# Patient Record
Sex: FEMALE | Race: WHITE | NOT HISPANIC OR LATINO
[De-identification: names, ages, dates, MRNs, and addresses within clinical notes are randomized per-mention and may not be internally consistent; named-entity substitution may affect disease eponyms.]

---

## 2017-08-22 ENCOUNTER — RESULT REVIEW (OUTPATIENT)
Age: 62
End: 2017-08-22

## 2018-03-01 ENCOUNTER — OUTPATIENT (OUTPATIENT)
Dept: OUTPATIENT SERVICES | Facility: HOSPITAL | Age: 63
LOS: 1 days | End: 2018-03-01
Payer: MEDICAID

## 2018-03-01 PROCEDURE — G9001: CPT

## 2018-03-13 ENCOUNTER — EMERGENCY (EMERGENCY)
Facility: HOSPITAL | Age: 63
LOS: 1 days | Discharge: HOME | End: 2018-03-13
Attending: EMERGENCY MEDICINE
Payer: MEDICAID

## 2018-03-13 VITALS — WEIGHT: 141.98 LBS | HEIGHT: 67 IN

## 2018-03-13 VITALS
SYSTOLIC BLOOD PRESSURE: 101 MMHG | RESPIRATION RATE: 16 BRPM | HEART RATE: 78 BPM | DIASTOLIC BLOOD PRESSURE: 68 MMHG | OXYGEN SATURATION: 96 % | TEMPERATURE: 98 F

## 2018-03-13 PROCEDURE — 99284 EMERGENCY DEPT VISIT MOD MDM: CPT

## 2018-03-13 PROCEDURE — 70450 CT HEAD/BRAIN W/O DYE: CPT

## 2018-03-13 PROCEDURE — 99284 EMERGENCY DEPT VISIT MOD MDM: CPT | Mod: 25

## 2018-03-13 PROCEDURE — 70450 CT HEAD/BRAIN W/O DYE: CPT | Mod: 26

## 2018-03-13 RX ORDER — ACETAMINOPHEN 500 MG
650 TABLET ORAL ONCE
Qty: 0 | Refills: 0 | Status: COMPLETED | OUTPATIENT
Start: 2018-03-13 | End: 2018-03-13

## 2018-03-13 RX ORDER — ACETAMINOPHEN WITH CODEINE 300MG-30MG
1 TABLET ORAL ONCE
Qty: 0 | Refills: 0 | Status: DISCONTINUED | OUTPATIENT
Start: 2018-03-13 | End: 2018-03-13

## 2018-03-13 RX ORDER — IBUPROFEN 200 MG
600 TABLET ORAL ONCE
Qty: 0 | Refills: 0 | Status: COMPLETED | OUTPATIENT
Start: 2018-03-13 | End: 2018-03-13

## 2018-03-13 RX ADMIN — Medication 1 TABLET(S): at 23:22

## 2018-03-13 RX ADMIN — Medication 1 TABLET(S): at 22:30

## 2018-03-13 RX ADMIN — Medication 600 MILLIGRAM(S): at 23:27

## 2018-03-13 NOTE — ED ADULT TRIAGE NOTE - CHIEF COMPLAINT QUOTE
" I fell 3 days ago and hit the side of my face, the swelling on my right eye is getting worst instead of getting better, I did not passed out "

## 2018-03-13 NOTE — ED PROVIDER NOTE - CRANIAL NERVE AND PUPILLARY EXAM
cough reflex intact/central vision intact/peripheral vision intact/gag reflex intact/tongue is midline/cranial nerves 2-12 intact/corneal reflex intact/central and peripheral vision intact/extra-ocular movements intact

## 2018-03-13 NOTE — ED PROVIDER NOTE - MEDICAL DECISION MAKING DETAILS
discussed ct findings. having sx of concussion. discussed hydration, no mental, emotional or physical straining. prn nsaids. Discussed anticipatory guidance and return precautions. Discussed results and gave copy to pt.  Dc with outpt follow up.

## 2018-03-15 DIAGNOSIS — R69 ILLNESS, UNSPECIFIED: ICD-10-CM

## 2018-05-01 ENCOUNTER — APPOINTMENT (OUTPATIENT)
Dept: SURGERY | Facility: CLINIC | Age: 63
End: 2018-05-01

## 2019-09-25 ENCOUNTER — RESULT REVIEW (OUTPATIENT)
Age: 64
End: 2019-09-25

## 2019-10-04 PROBLEM — E78.5 HYPERLIPIDEMIA, UNSPECIFIED: Chronic | Status: ACTIVE | Noted: 2018-03-13

## 2019-10-15 ENCOUNTER — APPOINTMENT (OUTPATIENT)
Dept: SURGERY | Facility: CLINIC | Age: 64
End: 2019-10-15
Payer: MEDICAID

## 2019-10-15 VITALS
DIASTOLIC BLOOD PRESSURE: 78 MMHG | TEMPERATURE: 98.3 F | OXYGEN SATURATION: 97 % | BODY MASS INDEX: 25.43 KG/M2 | SYSTOLIC BLOOD PRESSURE: 125 MMHG | WEIGHT: 162 LBS | HEIGHT: 67 IN | HEART RATE: 61 BPM

## 2019-10-15 DIAGNOSIS — Z78.9 OTHER SPECIFIED HEALTH STATUS: ICD-10-CM

## 2019-10-15 DIAGNOSIS — Z86.39 PERSONAL HISTORY OF OTHER ENDOCRINE, NUTRITIONAL AND METABOLIC DISEASE: ICD-10-CM

## 2019-10-15 DIAGNOSIS — Z83.3 FAMILY HISTORY OF DIABETES MELLITUS: ICD-10-CM

## 2019-10-15 DIAGNOSIS — Z86.79 PERSONAL HISTORY OF OTHER DISEASES OF THE CIRCULATORY SYSTEM: ICD-10-CM

## 2019-10-15 PROCEDURE — 45300 PROCTOSIGMOIDOSCOPY DX: CPT

## 2019-10-15 PROCEDURE — 99203 OFFICE O/P NEW LOW 30 MIN: CPT | Mod: 25

## 2019-10-15 RX ORDER — ZOLPIDEM TARTRATE 5 MG/1
TABLET, FILM COATED ORAL
Refills: 0 | Status: ACTIVE | COMMUNITY

## 2019-10-15 RX ORDER — PRAVASTATIN SODIUM 80 MG/1
TABLET ORAL
Refills: 0 | Status: ACTIVE | COMMUNITY

## 2019-10-15 RX ORDER — ALPRAZOLAM 2 MG/1
2 TABLET ORAL
Refills: 0 | Status: ACTIVE | COMMUNITY

## 2019-10-15 RX ORDER — SERTRALINE HYDROCHLORIDE 100 MG/1
100 TABLET, FILM COATED ORAL
Refills: 0 | Status: ACTIVE | COMMUNITY

## 2019-10-15 NOTE — PLAN
[FreeTextEntry1] : Rigid sigmoidoscopy\par Patient was placed in left lateral position. After a digital rectal exam, the sigmoidoscope was inserted gently.\par Scope was passed to 12   cm. \par Findings:; no bleeding seen. no masses. internal grade II hemorrhoids/external hemorrhoids seen \par \par \par patient is asymptomatic. surgical intervention not recommended at this time. \par patient will follow up if needed. \par

## 2019-10-15 NOTE — CONSULT LETTER
[Dear  ___] : Dear  [unfilled], [Consult Closing:] : Thank you very much for allowing me to participate in the care of this patient.  If you have any questions, please do not hesitate to contact me. [Consult Letter:] : I had the pleasure of evaluating your patient, [unfilled]. [Sincerely,] : Sincerely, [FreeTextEntry3] : Julian Sequeira MD\par

## 2019-10-15 NOTE — PHYSICAL EXAM
[Normal Breath Sounds] : Normal breath sounds [Normal Rate and Rhythm] : normal rate and rhythm [No Rash or Lesion] : No rash or lesion [Alert] : alert [Oriented to Time] : oriented to time [Oriented to Person] : oriented to person [Oriented to Place] : oriented to place [Calm] : calm [JVD] : no jugular venous distention  [de-identified] : A/Ox3; NAD. appears comfortable [de-identified] : EOMI [de-identified] : abd is soft, NT/ND\par surgical scar noted to the RUQ 2/2 open cholecystectomy \par  [de-identified] : +ROM, no joint swelling [de-identified] : RONIT- external hemorrhoids seen. no masses felt. no bleeding, no tenderness

## 2019-11-15 NOTE — REVIEW OF SYSTEMS
11/15:  Decreased lipitor to 40mg qhs      [Chills] : no chills [Fever] : no fever [Chest Pain] : no chest pain [Feeling Poorly] : not feeling poorly [Shortness Of Breath] : no shortness of breath [Lower Ext Edema] : no lower extremity edema [Wheezing] : no wheezing [Cough] : no cough [Abdominal Pain] : no abdominal pain [Diarrhea] : no diarrhea [Joint Swelling] : no joint swelling [Pelvic Pain] : no pelvic pain [Skin Lesions] : no skin lesions [Joint Stiffness] : no joint stiffness [Skin Wound] : no skin wound [Dizziness] : no dizziness [Muscle Weakness] : no muscle weakness [Anxiety] : no anxiety [Swollen Glands] : no swollen glands [FreeTextEntry7] : s/p open cholecystectomy, years ago

## 2021-11-18 ENCOUNTER — TRANSCRIPTION ENCOUNTER (OUTPATIENT)
Age: 66
End: 2021-11-18

## 2022-02-20 ENCOUNTER — EMERGENCY (EMERGENCY)
Facility: HOSPITAL | Age: 67
LOS: 1 days | Discharge: HOME | End: 2022-02-20
Attending: EMERGENCY MEDICINE
Payer: COMMERCIAL

## 2022-02-20 VITALS
RESPIRATION RATE: 18 BRPM | TEMPERATURE: 99 F | OXYGEN SATURATION: 97 % | HEIGHT: 67 IN | WEIGHT: 169.98 LBS | DIASTOLIC BLOOD PRESSURE: 94 MMHG | HEART RATE: 73 BPM | SYSTOLIC BLOOD PRESSURE: 168 MMHG

## 2022-02-20 LAB
HCT VFR BLD CALC: 36.2 % — SIGNIFICANT CHANGE UP (ref 34.5–45)
HGB BLD-MCNC: 12.2 G/DL — SIGNIFICANT CHANGE UP (ref 11.5–15.5)
MCHC RBC-ENTMCNC: 31.4 PG — SIGNIFICANT CHANGE UP (ref 27–34)
MCHC RBC-ENTMCNC: 33.7 GM/DL — SIGNIFICANT CHANGE UP (ref 32–36)
MCV RBC AUTO: 93.1 FL — SIGNIFICANT CHANGE UP (ref 80–100)
PLATELET # BLD AUTO: 227 K/UL — SIGNIFICANT CHANGE UP (ref 150–400)
RBC # BLD: 3.89 M/UL — SIGNIFICANT CHANGE UP (ref 3.8–5.2)
RBC # FLD: 12.8 % — SIGNIFICANT CHANGE UP (ref 10.3–14.5)

## 2022-02-20 PROCEDURE — 70450 CT HEAD/BRAIN W/O DYE: CPT | Mod: 26,MA

## 2022-02-20 PROCEDURE — 73080 X-RAY EXAM OF ELBOW: CPT | Mod: 26,LT

## 2022-02-20 PROCEDURE — 99285 EMERGENCY DEPT VISIT HI MDM: CPT

## 2022-02-20 RX ORDER — SODIUM CHLORIDE 9 MG/ML
1000 INJECTION INTRAMUSCULAR; INTRAVENOUS; SUBCUTANEOUS ONCE
Refills: 0 | Status: COMPLETED | OUTPATIENT
Start: 2022-02-20 | End: 2022-02-20

## 2022-02-20 NOTE — ED ADULT NURSE NOTE - NS ED NOTE  TALK SOMEONE YN
[FreeTextEntry1] : alert and oriented x 3, speech fluent, names easily, follows requests, good recall for recent and remote events.\par EOM full without sustained nystagmus, PERRL, intact LT V1-V3 bilaterally, face symmetrical, no dysarthria, tongue midline, normal shoulder elevation.\par Motor - normal motion in upper extremities bilaterally. normal rapid-alternating movements, no drift\par Sensory - intact LT x 4 ext\par Coord - no tremor, ataxia\par Gait - stands without difficulty 
No

## 2022-02-20 NOTE — ED ADULT TRIAGE NOTE - CHIEF COMPLAINT QUOTE
c/o bruise to rt. and left temple and pain to back of the head only when touched, s/p fall yesterday at a party , have some drinks , I am fine but my family insist to see a doctor  as per patient

## 2022-02-20 NOTE — ED ADULT NURSE NOTE - OBJECTIVE STATEMENT
66 yo female lying on bed c/o pain on the back of the head and bruise to bilateral temple s/p fall yesterday at a party. 68 yo female lying on bed c/o pain on the back of the head and bruise noted on bilateral temple s/p fall yesterday at a party. Patient denies LOC.

## 2022-02-20 NOTE — ED PROVIDER NOTE - PATIENT PORTAL LINK FT
You can access the FollowMyHealth Patient Portal offered by [facility] by registering at the following website: [email or web address]. By joining [facility]’s FollowMyHealth portal, you will also be able to view your health information using other applications (apps) compatible with our system.

## 2022-02-20 NOTE — ED PROVIDER NOTE - NSFOLLOWUPINSTRUCTIONS_ED_ALL_ED_FT
Alcohol Abuse and Dependence Information, Adult      Alcohol is a widely available drug. People drink alcohol in different amounts. People who drink alcohol very often and in large amounts often have problems during and after drinking. They may develop what is called an alcohol use disorder. There are two main types of alcohol use disorders:  •Alcohol abuse. This is when you use alcohol too much or too often. You may use alcohol to make yourself feel happy or to reduce stress. You may have a hard time setting a limit on the amount you drink.      •Alcohol dependence. This is when you use alcohol consistently for a period of time, and your body changes as a result. This can make it hard to stop drinking because you may start to feel sick or feel different when you do not use alcohol. These symptoms are known as withdrawal.        How can alcohol abuse and dependence affect me?    Alcohol abuse and dependence can have a negative effect on your life. Drinking too much can lead to addiction. You may feel like you need alcohol to function normally. You may drink alcohol before work in the morning, during the day, or as soon as you get home from work in the evening. These actions can result in:  •Poor work performance.      •Job loss.      •Financial problems.       •Car crashes or criminal charges from driving after drinking alcohol.      •Problems in your relationships with friends and family.       •Losing the trust and respect of coworkers, friends, and family.      Drinking heavily over a long period of time can permanently damage your body and brain, and can cause lifelong health issues, such as:  •Damage to your liver or pancreas.      •Heart problems, high blood pressure, or stroke.      •Certain cancers.      •Decreased ability to fight infections.      •Brain or nerve damage.      •Depression.      •Early (premature) death.      If you are careless or you crave alcohol, it is easy to drink more than your body can handle (overdose). Alcohol overdose is a serious situation that requires hospitalization. It may lead to permanent injuries or death.      What can increase my risk?    •Having a family history of alcohol abuse.      •Having depression or other mental health conditions.      •Beginning to drink at an early age.      •Binge drinking often.      •Experiencing trauma, stress, and an unstable home life during childhood.      •Spending time with people who drink often.        What actions can I take to prevent or manage alcohol abuse and dependence?  • Do not drink alcohol if:  •Your health care provider tells you not to drink.       •You are pregnant, may be pregnant, or are planning to become pregnant.      •If you drink alcohol:•Limit how much you use to:  •0–1 drink a day for women.       •0–2 drinks a day for men.        •Be aware of how much alcohol is in your drink. In the [place] one drink equals one 12 oz bottle of beer (355 mL), one 5 oz glass of wine (148 mL), or one 1½ oz glass of hard liquor (44 mL).        •Stop drinking if you have been drinking too much. This can be very hard to do if you are used to abusing alcohol. If you begin to have withdrawal symptoms, talk with your health care provider or a person that you trust. These symptoms may include anxiety, shaky hands, headache, nausea, sweating, or not being able to sleep.      •Choose to drink nonalcoholic beverages in social gatherings and places where there may be alcohol.      Activity     •Spend more time on activities that you enjoy that do not involve alcohol, like hobbies or exercise.      • Find healthy ways to cope with stress, such as exercise, meditation, or spending time with people you care about.       General information     •Talk to your family, coworkers, and friends about supporting you in your efforts to stop drinking. If they drink, ask them not to drink around you. Spend more time with people who do not drink alcohol.    •If you think that you have an alcohol dependency problem:  •Tell friends or family about your concerns.      •Talk with your health care provider or another health professional about where to get help.      •Work with a therapist and a chemical dependency counselor.      •Consider joining a support group for people who struggle with alcohol abuse and dependence.          Where to find support     •Your health care provider.      •SMART Recovery: www.smartrecovery.org      Therapy and support groups     •Local treatment centers or chemical dependency counselors.      •Local AA groups in your community: www.aa.org        Where to find more information    •Centers for Disease Control and Prevention: www.cdc.gov      •National [place] on Alcohol Abuse and Alcoholism: www.niaaa.nih.gov      •Alcoholics Anonymous (AA): www.aa.org        Contact a health care provider if:    •You drank more or for longer than you intended on more than one occasion.      •You tried to stop drinking or to cut back on how much you drink, but you were not able to.      •You often drink to the point of vomiting or passing out.      •You want to drink so badly that you cannot think about anything else.      •You have problems in your life due to drinking, but you continue to drink.      •You keep drinking even though you feel anxious, depressed, or have experienced memory loss.      •You have stopped doing the things you used to enjoy in order to drink.      •You have to drink more than you used to in order to get the effect you want.      •You experience anxiety, sweating, nausea, shakiness, and trouble sleeping when you try to stop drinking.        Get help right away if:    •You have thoughts about hurting yourself or others.    •You have serious withdrawal symptoms, including:  •Confusion.      •Racing heart.      •High blood pressure.      •Fever.        If you ever feel like you may hurt yourself or others, or have thoughts about taking your own life, get help right away. You can go to your nearest emergency department or call:   • Your local emergency services (911 in the [place]       • A suicide crisis helpline, such as the National Suicide Prevention Lifeline at 1-617.963.7369. This is open 24 hours a day.         Summary    •Alcohol abuse and dependence can have a negative effect on your life. Drinking too much or too often can lead to addiction.      •If you drink alcohol, limit how much you use.      •If you are having trouble keeping your drinking under control, find ways to change your behavior. Hobbies, calming activities, exercise, or support groups can help.      •If you feel you need help with changing your drinking habits, talk with your health care provider, a good friend, or a therapist, or go to an AA group.      This information is not intended to replace advice given to you by your health care provider. Make sure you discuss any questions you have with your health care provider.

## 2022-02-20 NOTE — ED PROVIDER NOTE - CLINICAL SUMMARY MEDICAL DECISION MAKING FREE TEXT BOX
68 yo F chronic alcoholic s/p mechanical fall while drunk yesterday. Pt brought in by sister for eval. Pt with normal neuro exam. no signs to suggest withdrawal or other drug use. No SI or hallucinations. Will perform CT head and consult SW for rehab options.

## 2022-02-21 VITALS
SYSTOLIC BLOOD PRESSURE: 128 MMHG | HEART RATE: 76 BPM | RESPIRATION RATE: 18 BRPM | DIASTOLIC BLOOD PRESSURE: 82 MMHG | OXYGEN SATURATION: 97 % | TEMPERATURE: 98 F

## 2022-02-21 LAB
ALBUMIN SERPL ELPH-MCNC: 2.9 G/DL — LOW (ref 3.5–5)
ALP SERPL-CCNC: 103 U/L — SIGNIFICANT CHANGE UP (ref 40–120)
ALT FLD-CCNC: 58 U/L DA — SIGNIFICANT CHANGE UP (ref 10–60)
ANION GAP SERPL CALC-SCNC: 7 MMOL/L — SIGNIFICANT CHANGE UP (ref 5–17)
AST SERPL-CCNC: 46 U/L — HIGH (ref 10–40)
BASOPHILS # BLD AUTO: 0.03 K/UL — SIGNIFICANT CHANGE UP (ref 0–0.2)
BASOPHILS NFR BLD AUTO: 0.5 % — SIGNIFICANT CHANGE UP (ref 0–2)
BILIRUB SERPL-MCNC: 0.4 MG/DL — SIGNIFICANT CHANGE UP (ref 0.2–1.2)
BUN SERPL-MCNC: 13 MG/DL — SIGNIFICANT CHANGE UP (ref 7–18)
CALCIUM SERPL-MCNC: 8.5 MG/DL — SIGNIFICANT CHANGE UP (ref 8.4–10.5)
CHLORIDE SERPL-SCNC: 106 MMOL/L — SIGNIFICANT CHANGE UP (ref 96–108)
CO2 SERPL-SCNC: 25 MMOL/L — SIGNIFICANT CHANGE UP (ref 22–31)
CREAT SERPL-MCNC: 0.7 MG/DL — SIGNIFICANT CHANGE UP (ref 0.5–1.3)
EOSINOPHIL # BLD AUTO: 0.02 K/UL — SIGNIFICANT CHANGE UP (ref 0–0.5)
EOSINOPHIL NFR BLD AUTO: 0.3 % — SIGNIFICANT CHANGE UP (ref 0–6)
GLUCOSE SERPL-MCNC: 93 MG/DL — SIGNIFICANT CHANGE UP (ref 70–99)
IMM GRANULOCYTES NFR BLD AUTO: 0.2 % — SIGNIFICANT CHANGE UP (ref 0–1.5)
LIDOCAIN IGE QN: 60 U/L — LOW (ref 73–393)
LYMPHOCYTES # BLD AUTO: 1.5 K/UL — SIGNIFICANT CHANGE UP (ref 1–3.3)
LYMPHOCYTES # BLD AUTO: 25 % — SIGNIFICANT CHANGE UP (ref 13–44)
MONOCYTES # BLD AUTO: 0.41 K/UL — SIGNIFICANT CHANGE UP (ref 0–0.9)
MONOCYTES NFR BLD AUTO: 6.8 % — SIGNIFICANT CHANGE UP (ref 2–14)
NEUTROPHILS # BLD AUTO: 4.04 K/UL — SIGNIFICANT CHANGE UP (ref 1.8–7.4)
NEUTROPHILS NFR BLD AUTO: 67.2 % — SIGNIFICANT CHANGE UP (ref 43–77)
NRBC # BLD: 0 /100 WBCS — SIGNIFICANT CHANGE UP (ref 0–0)
POTASSIUM SERPL-MCNC: 3.8 MMOL/L — SIGNIFICANT CHANGE UP (ref 3.5–5.3)
POTASSIUM SERPL-SCNC: 3.8 MMOL/L — SIGNIFICANT CHANGE UP (ref 3.5–5.3)
PROT SERPL-MCNC: 6.9 G/DL — SIGNIFICANT CHANGE UP (ref 6–8.3)
SODIUM SERPL-SCNC: 138 MMOL/L — SIGNIFICANT CHANGE UP (ref 135–145)
WBC # BLD: 6.01 K/UL — SIGNIFICANT CHANGE UP (ref 3.8–10.5)
WBC # FLD AUTO: 6.01 K/UL — SIGNIFICANT CHANGE UP (ref 3.8–10.5)

## 2022-02-21 PROCEDURE — 73080 X-RAY EXAM OF ELBOW: CPT

## 2022-02-21 PROCEDURE — 36415 COLL VENOUS BLD VENIPUNCTURE: CPT

## 2022-02-21 PROCEDURE — 96360 HYDRATION IV INFUSION INIT: CPT

## 2022-02-21 PROCEDURE — 85025 COMPLETE CBC W/AUTO DIFF WBC: CPT

## 2022-02-21 PROCEDURE — 93005 ELECTROCARDIOGRAM TRACING: CPT

## 2022-02-21 PROCEDURE — 99285 EMERGENCY DEPT VISIT HI MDM: CPT | Mod: 25

## 2022-02-21 PROCEDURE — 80053 COMPREHEN METABOLIC PANEL: CPT

## 2022-02-21 PROCEDURE — 93010 ELECTROCARDIOGRAM REPORT: CPT

## 2022-02-21 PROCEDURE — 83690 ASSAY OF LIPASE: CPT

## 2022-02-21 PROCEDURE — 70450 CT HEAD/BRAIN W/O DYE: CPT | Mod: MA

## 2022-02-21 RX ORDER — DIAZEPAM 5 MG
5 TABLET ORAL ONCE
Refills: 0 | Status: DISCONTINUED | OUTPATIENT
Start: 2022-02-21 | End: 2022-02-21

## 2022-02-21 RX ADMIN — SODIUM CHLORIDE 1000 MILLILITER(S): 9 INJECTION INTRAMUSCULAR; INTRAVENOUS; SUBCUTANEOUS at 00:59

## 2022-02-21 RX ADMIN — SODIUM CHLORIDE 1000 MILLILITER(S): 9 INJECTION INTRAMUSCULAR; INTRAVENOUS; SUBCUTANEOUS at 01:59

## 2022-02-21 RX ADMIN — Medication 5 MILLIGRAM(S): at 02:30

## 2022-02-21 NOTE — CHART NOTE - NSCHARTNOTEFT_GEN_A_CORE
Pt is a 67 year old female who was brought to the ED by her sister for alcohol intoxication. Pt screened positive for sbirt. Belen met with Pt at bedside, she appeared alert and orientedx3. Belen introduced self and role explained.  Pt was able to participate in sbirt screening and she  admitted to excessive alcohol use.  Pt reports excessive drinking when she lost her 41 year old son recently . Pt declined active referral to treatment. She repots connection to support services, an   program where she sees a psychiatrist and psychotherapist once  a month. She denied illicit drug use but reports consumption of prescribed psychotropic medications. ( ambiens , xanax and zoloft ) Pt was provided with psychoeducation re alcohol, aosas resources and emotional support. Magdalena provided her contact information to Pt's sister Randee as requested should an additional need arise. Pt was socially cleared and physician made aware.

## 2022-02-21 NOTE — SBIRT NOTE ADULT - NSSBIRTBRIEFINTDET_GEN_A_CORE
Pt declined active referral to  treatment .She repots that she is connected to MH program, she sees a psychiatrist and psychotherapist. once a month. She denied illicit drug use. Psychoeducation re alcohol , aosas resources and emotional support was provided.

## 2022-09-29 NOTE — ED PROVIDER NOTE - OBJECTIVE STATEMENT
66 yo F chronic alcoholic p/w fall at home yesterday while drunk. Pt states she tripped and fell onto floor. Denies LOC, HA, dizziness or nausea. Pt seen by sister later who brought her to ED. drinks beer daily. Reports grief of losing only son at 41 year last year in February but states her drinking was before then. Has had detox outpatient in the past but did not complete it. Pt also takes Ambien and Xanax prescribed by her therapist. Reports no SI, HI or hallucinations.
Fahad Godinez(Attending)

## 2023-01-26 ENCOUNTER — INPATIENT (INPATIENT)
Facility: HOSPITAL | Age: 68
LOS: 2 days | Discharge: HOME | DRG: 897 | End: 2023-01-29
Attending: STUDENT IN AN ORGANIZED HEALTH CARE EDUCATION/TRAINING PROGRAM | Admitting: STUDENT IN AN ORGANIZED HEALTH CARE EDUCATION/TRAINING PROGRAM
Payer: COMMERCIAL

## 2023-01-26 VITALS
HEART RATE: 64 BPM | DIASTOLIC BLOOD PRESSURE: 91 MMHG | TEMPERATURE: 98 F | OXYGEN SATURATION: 97 % | RESPIRATION RATE: 20 BRPM | WEIGHT: 169.98 LBS | SYSTOLIC BLOOD PRESSURE: 172 MMHG

## 2023-01-26 LAB
ALBUMIN SERPL ELPH-MCNC: 3 G/DL — LOW (ref 3.5–5)
ALP SERPL-CCNC: 96 U/L — SIGNIFICANT CHANGE UP (ref 40–120)
ALT FLD-CCNC: 22 U/L DA — SIGNIFICANT CHANGE UP (ref 10–60)
ANION GAP SERPL CALC-SCNC: 9 MMOL/L — SIGNIFICANT CHANGE UP (ref 5–17)
APPEARANCE UR: CLEAR — SIGNIFICANT CHANGE UP
AST SERPL-CCNC: 41 U/L — HIGH (ref 10–40)
BASOPHILS # BLD AUTO: 0.03 K/UL — SIGNIFICANT CHANGE UP (ref 0–0.2)
BASOPHILS NFR BLD AUTO: 0.4 % — SIGNIFICANT CHANGE UP (ref 0–2)
BILIRUB SERPL-MCNC: 0.5 MG/DL — SIGNIFICANT CHANGE UP (ref 0.2–1.2)
BILIRUB UR-MCNC: NEGATIVE — SIGNIFICANT CHANGE UP
BUN SERPL-MCNC: 6 MG/DL — LOW (ref 7–18)
CALCIUM SERPL-MCNC: 8.9 MG/DL — SIGNIFICANT CHANGE UP (ref 8.4–10.5)
CHLORIDE SERPL-SCNC: 104 MMOL/L — SIGNIFICANT CHANGE UP (ref 96–108)
CO2 SERPL-SCNC: 30 MMOL/L — SIGNIFICANT CHANGE UP (ref 22–31)
COLOR SPEC: YELLOW — SIGNIFICANT CHANGE UP
CREAT SERPL-MCNC: 0.67 MG/DL — SIGNIFICANT CHANGE UP (ref 0.5–1.3)
DIFF PNL FLD: ABNORMAL
EGFR: 96 ML/MIN/1.73M2 — SIGNIFICANT CHANGE UP
EOSINOPHIL # BLD AUTO: 0.02 K/UL — SIGNIFICANT CHANGE UP (ref 0–0.5)
EOSINOPHIL NFR BLD AUTO: 0.3 % — SIGNIFICANT CHANGE UP (ref 0–6)
FLUAV AG NPH QL: SIGNIFICANT CHANGE UP
FLUBV AG NPH QL: SIGNIFICANT CHANGE UP
GLUCOSE SERPL-MCNC: 120 MG/DL — HIGH (ref 70–99)
GLUCOSE UR QL: NEGATIVE — SIGNIFICANT CHANGE UP
HCG SERPL-ACNC: 6 MIU/ML — SIGNIFICANT CHANGE UP
HCT VFR BLD CALC: 39 % — SIGNIFICANT CHANGE UP (ref 34.5–45)
HGB BLD-MCNC: 12.9 G/DL — SIGNIFICANT CHANGE UP (ref 11.5–15.5)
HIV 1 & 2 AB SERPL IA.RAPID: SIGNIFICANT CHANGE UP
IMM GRANULOCYTES NFR BLD AUTO: 0.3 % — SIGNIFICANT CHANGE UP (ref 0–0.9)
KETONES UR-MCNC: ABNORMAL
LACTATE SERPL-SCNC: 1.6 MMOL/L — SIGNIFICANT CHANGE UP (ref 0.7–2)
LEUKOCYTE ESTERASE UR-ACNC: ABNORMAL
LYMPHOCYTES # BLD AUTO: 0.87 K/UL — LOW (ref 1–3.3)
LYMPHOCYTES # BLD AUTO: 11.2 % — LOW (ref 13–44)
MAGNESIUM SERPL-MCNC: 1.5 MG/DL — LOW (ref 1.6–2.6)
MCHC RBC-ENTMCNC: 32.1 PG — SIGNIFICANT CHANGE UP (ref 27–34)
MCHC RBC-ENTMCNC: 33.1 GM/DL — SIGNIFICANT CHANGE UP (ref 32–36)
MCV RBC AUTO: 97 FL — SIGNIFICANT CHANGE UP (ref 80–100)
MONOCYTES # BLD AUTO: 0.57 K/UL — SIGNIFICANT CHANGE UP (ref 0–0.9)
MONOCYTES NFR BLD AUTO: 7.3 % — SIGNIFICANT CHANGE UP (ref 2–14)
NEUTROPHILS # BLD AUTO: 6.29 K/UL — SIGNIFICANT CHANGE UP (ref 1.8–7.4)
NEUTROPHILS NFR BLD AUTO: 80.5 % — HIGH (ref 43–77)
NITRITE UR-MCNC: NEGATIVE — SIGNIFICANT CHANGE UP
NRBC # BLD: 0 /100 WBCS — SIGNIFICANT CHANGE UP (ref 0–0)
PH UR: 6 — SIGNIFICANT CHANGE UP (ref 5–8)
PLATELET # BLD AUTO: 143 K/UL — LOW (ref 150–400)
POTASSIUM SERPL-MCNC: 2.8 MMOL/L — CRITICAL LOW (ref 3.5–5.3)
POTASSIUM SERPL-SCNC: 2.8 MMOL/L — CRITICAL LOW (ref 3.5–5.3)
PROT SERPL-MCNC: 7.1 G/DL — SIGNIFICANT CHANGE UP (ref 6–8.3)
PROT UR-MCNC: 30 MG/DL
RBC # BLD: 4.02 M/UL — SIGNIFICANT CHANGE UP (ref 3.8–5.2)
RBC # FLD: 15.6 % — HIGH (ref 10.3–14.5)
SARS-COV-2 RNA SPEC QL NAA+PROBE: SIGNIFICANT CHANGE UP
SODIUM SERPL-SCNC: 143 MMOL/L — SIGNIFICANT CHANGE UP (ref 135–145)
SP GR SPEC: 1.01 — SIGNIFICANT CHANGE UP (ref 1.01–1.02)
TROPONIN I, HIGH SENSITIVITY RESULT: 20 NG/L — SIGNIFICANT CHANGE UP
UROBILINOGEN FLD QL: NEGATIVE — SIGNIFICANT CHANGE UP
WBC # BLD: 7.8 K/UL — SIGNIFICANT CHANGE UP (ref 3.8–10.5)
WBC # FLD AUTO: 7.8 K/UL — SIGNIFICANT CHANGE UP (ref 3.8–10.5)

## 2023-01-26 PROCEDURE — 99285 EMERGENCY DEPT VISIT HI MDM: CPT

## 2023-01-26 PROCEDURE — 99223 1ST HOSP IP/OBS HIGH 75: CPT

## 2023-01-26 PROCEDURE — 70450 CT HEAD/BRAIN W/O DYE: CPT | Mod: 26,MA

## 2023-01-26 PROCEDURE — 93010 ELECTROCARDIOGRAM REPORT: CPT

## 2023-01-26 RX ORDER — POTASSIUM CHLORIDE 20 MEQ
40 PACKET (EA) ORAL ONCE
Refills: 0 | Status: COMPLETED | OUTPATIENT
Start: 2023-01-26 | End: 2023-01-26

## 2023-01-26 RX ORDER — MAGNESIUM SULFATE 500 MG/ML
2 VIAL (ML) INJECTION ONCE
Refills: 0 | Status: COMPLETED | OUTPATIENT
Start: 2023-01-26 | End: 2023-01-26

## 2023-01-26 RX ORDER — SODIUM CHLORIDE 9 MG/ML
1000 INJECTION INTRAMUSCULAR; INTRAVENOUS; SUBCUTANEOUS ONCE
Refills: 0 | Status: COMPLETED | OUTPATIENT
Start: 2023-01-26 | End: 2023-01-26

## 2023-01-26 RX ORDER — THIAMINE MONONITRATE (VIT B1) 100 MG
100 TABLET ORAL ONCE
Refills: 0 | Status: COMPLETED | OUTPATIENT
Start: 2023-01-26 | End: 2023-01-26

## 2023-01-26 RX ORDER — POTASSIUM CHLORIDE 20 MEQ
10 PACKET (EA) ORAL
Refills: 0 | Status: DISCONTINUED | OUTPATIENT
Start: 2023-01-26 | End: 2023-01-29

## 2023-01-26 RX ORDER — ACETAMINOPHEN 500 MG
650 TABLET ORAL ONCE
Refills: 0 | Status: COMPLETED | OUTPATIENT
Start: 2023-01-26 | End: 2023-01-26

## 2023-01-26 RX ADMIN — Medication 650 MILLIGRAM(S): at 20:25

## 2023-01-26 RX ADMIN — Medication 100 MILLIEQUIVALENT(S): at 21:15

## 2023-01-26 RX ADMIN — Medication 40 MILLIEQUIVALENT(S): at 20:58

## 2023-01-26 RX ADMIN — Medication 100 MILLIGRAM(S): at 20:27

## 2023-01-26 RX ADMIN — Medication 1 MILLIGRAM(S): at 23:33

## 2023-01-26 RX ADMIN — Medication 1 MILLIGRAM(S): at 20:26

## 2023-01-26 RX ADMIN — SODIUM CHLORIDE 1000 MILLILITER(S): 9 INJECTION INTRAMUSCULAR; INTRAVENOUS; SUBCUTANEOUS at 20:26

## 2023-01-26 RX ADMIN — Medication 650 MILLIGRAM(S): at 21:05

## 2023-01-26 NOTE — SBIRT NOTE ADULT - NSSBIRTALCPASSREFTXDET_GEN_A_CORE
Pt reports excessive drinking but declines active referral to treatment. She repots loosing connection to prior support services and MH program where she sees a psychiatrist / psychotherapist once  a month. Importance of reconnecting to the support services were fully explained. Emotional support ,psychoeducation re alcohol, Community mental health and aosas resources were provided.

## 2023-01-26 NOTE — H&P ADULT - ATTENDING COMMENTS
67 year old woman with PMH of chronic alcohol abuse, panic / anxiety disorder brought in by EMS for dizzy and panic spells while waiting to  her prescription for Xanax, zoloft and Ambien. Of note, she drinks alcohol and uses her medications  as well daily but has not used them in the last few days.    Vital Signs Last 24 Hrs  T(C): 36.5 (2023 17:38), Max: 36.5 (2023 17:38)  T(F): 97.7 (2023 17:38), Max: 97.7 (2023 17:38)  HR: 64 (2023 17:38) (64 - 64)  BP: 172/91 (2023 17:38) (172/91 - 172/91)  RR: 20 (2023 17:38) (20 - 20)  SpO2: 97% (2023 17:38) (97% - 97%)    Labs                         12.9   7.80  )-----------( 143      ( 2023 19:40 )             39.0         143  |  104  |  6<L>  ----------------------------<  120<H>  2.8<LL>   |  30  |  0.67    Ca    8.9      2023 19:40  Mg     1.5         TPro  7.1  /  Alb  3.0<L>  /  TBili  0.5  /  DBili  x   /  AST  41<H>  /  ALT  22  /  AlkPhos  96      Urinalysis Basic - ( 2023 21:07 )    Color: Yellow / Appearance: Clear / S.015 / pH: x  Gluc: x / Ketone: Trace  / Bili: Negative / Urobili: Negative   Blood: x / Protein: 30 mg/dL / Nitrite: Negative   Leuk Esterase: Moderate / RBC: 0-2 /HPF / WBC 11-25 /HPF   Sq Epi: x / Non Sq Epi: Many /HPF / Bacteria: Moderate /HPF    CT head - unremarkable     Impression   - Acute alcohol withdrawal   - Hypomagnesemia   - Hypokalemia  - UTI/asymptomatic bacteruria  - Panic/ Anxiety disorder    Plan   - Admit to medicine   - CIWA protocol   - Thiamine supplements / MVI   - Magnesium sulphate 2g IV  followed by potassium replacement   - repeat labs in AM.  - Psych evaluation of home meds  - 67 year old woman with PMH of chronic alcohol abuse, panic / anxiety disorder brought in by EMS for dizzy and panic spells while waiting to  her prescription for Xanax, zoloft and Ambien. Of note, she drinks alcohol and uses her medications  as well daily but has not used them in the last few days.    Vital Signs Last 24 Hrs  T(C): 36.5 (2023 17:38), Max: 36.5 (2023 17:38)  T(F): 97.7 (2023 17:38), Max: 97.7 (2023 17:38)  HR: 64 (2023 17:38) (64 - 64)  BP: 172/91 (2023 17:38) (172/91 - 172/91)  RR: 20 (2023 17:38) (20 - 20)  SpO2: 97% (2023 17:38) (97% - 97%)    Labs                         12.9   7.80  )-----------( 143      ( 2023 19:40 )             39.0         143  |  104  |  6<L>  ----------------------------<  120<H>  2.8<LL>   |  30  |  0.67    Ca    8.9      2023 19:40  Mg     1.5         TPro  7.1  /  Alb  3.0<L>  /  TBili  0.5  /  DBili  x   /  AST  41<H>  /  ALT  22  /  AlkPhos  96      Urinalysis Basic - ( 2023 21:07 )    Color: Yellow / Appearance: Clear / S.015 / pH: x  Gluc: x / Ketone: Trace  / Bili: Negative / Urobili: Negative   Blood: x / Protein: 30 mg/dL / Nitrite: Negative   Leuk Esterase: Moderate / RBC: 0-2 /HPF / WBC 11-25 /HPF   Sq Epi: x / Non Sq Epi: Many /HPF / Bacteria: Moderate /HPF    CT head - unremarkable     Impression   - Acute alcohol withdrawal   - Hypomagnesemia   - Hypokalemia  - Asymptomatic bacteruria  - Panic/ Anxiety disorder    Plan   - Admit to medicine   - [facility] protocol   - Thiamine supplements / MVI   - Magnesium sulphate 2g IV  followed by potassium replacement   - repeat labs in AM.  - Psych evaluation of home meds  - Alcohol cessation counselling   -

## 2023-01-26 NOTE — H&P ADULT - PROBLEM SELECTOR PLAN 3
- UA positive  - Denies dysuria, increased frequency - UA positive  - Denies dysuria, denies increased frequency

## 2023-01-26 NOTE — H&P ADULT - PROBLEM SELECTOR PLAN 6
- Home med Sertraline 100mg  - Has outpt therapist - Home med Sertraline 100mg  - Has outpt therapist  - Telepsych consult please

## 2023-01-26 NOTE — H&P ADULT - PROBLEM SELECTOR PLAN 2
- Quit alcohol but relapsed in Feb 2021 when her son passed away. Also resumed smoking tobacco at the same time  - TAMMY started  - S/p Ativan in ED  - Ativan taper? + PRN  - Thaimine, folic acid, multivitamin, cyanocobalamine - Quit alcohol but relapsed in Feb 2021 when her son passed away. Also resumed smoking tobacco at the same time  - S/p Ativan in ED  - CIWA started with PRN ativan  - Thiamine, folic acid, multivitamin, cyanocobalamin  - Telepsych can resume patient's benzodiazepine (Xanax) or recommmend other

## 2023-01-26 NOTE — H&P ADULT - ASSESSMENT
Patient is a 67F, with PMHx of Bipolar disorder, Anxiety, Insomnia, Alcohol abuse (relapsed since her son passed away in Feb 2021), and HLD, who comes in with withdrawal symptoms after not taking Xanax for 3 days. Admitted for benzodiazepine withdrawal.

## 2023-01-26 NOTE — ED PROVIDER NOTE - OBJECTIVE STATEMENT
67-year-old female with history of alcohol abuse, hyperlipidemia, presents with shaking. She states she had gone to the pharmacy this afternoon to  her prescriptions for Xanax, Zoloft, and Ambien, but before she was able to get her medications she had an episode where her hands were flying in the air and she was seeing different colors. She states she was awake the entire time and did not fall and recalls the entire incident. States her last alcohol use was 2 days ago, and last Xanax was 2 to 3 days ago. Reports past alcohol withdrawal. States all medications are prescribed by a psychiatrist, Xanax is 1 mg 3 times daily, but she ran out a little bit early as she sometimes takes a little extra due to anxiety. Mild headache at this time as well. Denies head trauma/fall, fever, cough, chest pain, shortness of breath, LOC, history of seizures, and all other symptoms. Patient denies street or other drug use, interviewed separately from her family here. Niece and brother at bedside states patient has had 2 past alcohol rehab admissions.

## 2023-01-26 NOTE — H&P ADULT - NSHPREVIEWOFSYSTEMS_GEN_ALL_CORE
CONSTITUTIONAL: No fever, weight loss, (+)fatigue. No generalized weakness  EYES: No eye pain, visual disturbances, or discharge  ENT:  No difficulty hearing, tinnitus, vertigo; No sinus or throat pain  NECK: No pain or stiffness  RESPIRATORY: No cough, wheezing, chills or hemoptysis; No Shortness of Breath  CARDIOVASCULAR: No chest pain, palpitations, passing out, dizziness, or leg swelling  GASTROINTESTINAL: No abdominal or epigastric pain. No nausea, vomiting, or hematemesis; No diarrhea. No constipation. No diarrhea. No melena or hematochezia.  GENITOURINARY: No dysuria, frequency, hematuria, or incontinence  NEUROLOGICAL: No headaches, memory loss, loss of strength, numbness, or tremors  SKIN: No itching, burning, rashes, or lesions   LYMPH Nodes: No enlarged glands  ENDOCRINE: No heat or cold intolerance; No hair loss  MUSCULOSKELETAL: No joint pain or swelling; No muscle, back, No extremity pain  PSYCHIATRIC: (+)anxiety, (+)difficulty sleeping  HEME/LYMPH: No easy bruising, or bleeding gums  ALLERGY AND IMMUNOLOGIC: No hives or eczema

## 2023-01-26 NOTE — H&P ADULT - PROBLEM SELECTOR PLAN 1
- Patient ran out of her Xanax for 3 days  - Had withdrawal psychosis (visual hallucination), elevated BP, tachycardia, dizziness, tremor, seizure?  - C/w benzodiazepines

## 2023-01-26 NOTE — H&P ADULT - PROBLEM SELECTOR PLAN 4
- Home med Xanax 1mg TID  - Has outpt therapist - Home med Xanax 1mg TID for years  - Has outpt therapist

## 2023-01-26 NOTE — ED PROVIDER NOTE - PHYSICAL EXAMINATION
Afebrile, hemodynamically stable, saturating well on room air  NAD, nontoxic appearing, anxious appearing, no WOB, speaking full sentences  Head NCAT  Pupils equal, EOMI, anicteric  MMM, tongue fasciculations  No JVD  RRR, nml S1/S2, no m/r/g  Lungs CTAB, no w/r/r  Abd soft, NT, ND, nml BS, no rebound or guarding  AAO, CN's 3-12 intact, motor 5/5 and sensation symmetric in all extremities   VELEZ spontaneously, no leg cyanosis or edema, intention tremor  Skin warm, well perfused, no rashes or hives

## 2023-01-26 NOTE — ED PROVIDER NOTE - CARE PLAN
1 Principal Discharge DX:	Alcohol withdrawal  Secondary Diagnosis:	Dizziness   Principal Discharge DX:	Alcohol withdrawal  Secondary Diagnosis:	Dizziness  Secondary Diagnosis:	Acute hypokalemia  Secondary Diagnosis:	Hypomagnesemia

## 2023-01-26 NOTE — H&P ADULT - NSHPPHYSICALEXAM_GEN_ALL_CORE
GENERAL: NAD, well-groomed, well-developed  HEAD:  Atraumatic, Normocephalic  EYES: EOMI, PERRLA, conjunctiva and sclera clear  ENMT: No tonsillar erythema, exudates, or enlargement; Moist mucous membranes, No lesions  NECK: Supple, normal appearance, No JVD; Normal thyroid; Trachea midline  NERVOUS SYSTEM:  Alert & Oriented X3,  Motor Strength 5/5 B/L upper and lower extremities, sensation intact. Mild tremor on outstretched hands. L upper back tenderness(burning)  CHEST/LUNG: Lungs clear to auscultation bilaterally, No rales, rhonchi, wheezing   HEART: Regular rate and rhythm; No murmurs, rubs, or gallops  ABDOMEN: Soft, Nontender, Nondistended; Bowel sounds present  EXTREMITIES:  2+ Peripheral Pulses, No clubbing, cyanosis, or edema  LYMPH: No lymphadenopathy noted  SKIN: No rashes or lesions;  Good capillary refill

## 2023-01-26 NOTE — ED ADULT NURSE NOTE - NSIMPLEMENTINTERV_GEN_ALL_ED
Implemented All Fall Risk Interventions:  [place] to call system. Call bell, personal items and telephone within reach. Instruct patient to call for assistance. Room bathroom lighting operational. Non-slip footwear when patient is off stretcher. Physically safe environment: no spills, clutter or unnecessary equipment. Stretcher in lowest position, wheels locked, appropriate side rails in place. Provide visual cue, wrist band, yellow gown, etc. Monitor gait and stability. Monitor for mental status changes and reorient to person, place, and time. Review medications for side effects contributing to fall risk. Reinforce activity limits and safety measures with patient and family.

## 2023-01-26 NOTE — H&P ADULT - NSICDXFAMILYHX_GEN_ALL_CORE_FT
FAMILY HISTORY:  Grandparent  Still living? Unknown  Family history of anxiety disorder, Age at diagnosis: Age Unknown    Aunt  Still living? Unknown  Family history of anxiety disorder, Age at diagnosis: Age Unknown

## 2023-01-26 NOTE — H&P ADULT - HISTORY OF PRESENT ILLNESS
Patient is a 67F, with PMHx of Bipolar disorder, Anxiety, Insomnia, Alcohol abuse (relapsed since her son passed away in Feb 2021), and HLD, who comes in with withdrawal symptoms after not taking Xanax for 3 days. She ran out her medications 3 days ago and was on her way to pharmacy to pick them up. While she was driving, she started having visual hallucinations with red, green colors. When she arrived at the pharmacy, she was dizzy, still seeing colors, had palpitations, and her arms were swinging. She had to lean on the wall and the episode lasted for 10 minutes. She screamed for help and EMS was called. She endorses chronic R sided sciatica. Right now, endorses L upper back burning pain, headache, anxiety, and fatigue from insomnia, and denies any current fever, chills, nausea, vomit, chest pain, shortness of breath, cough, lightheadedness, abdominal pain, diarrhea, dark/bloody stool, dysuria, hematuria, loss of strength, or loss of sensation.

## 2023-01-26 NOTE — ED ADULT NURSE NOTE - OBJECTIVE STATEMENT
Pt states she had a panic attacks while in the store today, pt remembers everything that happened, pt states she could not control her body. Pt admits to daily etoh use. No active distress noted.

## 2023-01-26 NOTE — ED PROVIDER NOTE - CLINICAL SUMMARY MEDICAL DECISION MAKING FREE TEXT BOX
Patient with no history of seizure and was awake and standing the entire time, with low suspicion for seizure. Patient appearance and history at this time concerning for mild withdrawal, consistent with both alcohol and benzodiazepine use. Given IV fluids, lorazepam, thiamine with   diff dizziness Patient with no history of seizure and was awake and standing the entire time, with low suspicion for seizure. Patient appearance and history at this time concerning for mild withdrawal, consistent with both alcohol and benzodiazepine use. Given IV fluids, lorazepam, thiamine with significant improvement in her symptoms. Character low suspicion for CVA and no focal or localizing findings. No significant arrhythmia or e/o aortic outflow obstruction. No anemia or bleeding. No CP/SOB to suggest ACS or e/o DVT to sugget PE. No fever or specific infectious symptoms. Noted hypokalemia and hypomagnesemia which may have contributed to her symptoms, and these were repleted. Patient well appearing, hemodynamically stable. Admitted to internal medicine for further monitoring, w/u, and care. D/w hospitalist.

## 2023-01-26 NOTE — H&P ADULT - SOCIAL HISTORY: TOBACCO USE
Quit cigarettes 33 yrs ago but relapsed in Feb 2021 when her son passed away. Resumed smoking tobacco

## 2023-01-26 NOTE — H&P ADULT - NSICDXPASTMEDICALHX_GEN_ALL_CORE_FT
PAST MEDICAL HISTORY:  Alcohol use disorder     Anxiety     Bipolar 1 disorder, depressed     HLD (hyperlipidemia)     HTN (hypertension)

## 2023-01-27 DIAGNOSIS — R82.71 BACTERIURIA: ICD-10-CM

## 2023-01-27 DIAGNOSIS — F31.9 BIPOLAR DISORDER, UNSPECIFIED: ICD-10-CM

## 2023-01-27 DIAGNOSIS — E78.5 HYPERLIPIDEMIA, UNSPECIFIED: ICD-10-CM

## 2023-01-27 DIAGNOSIS — Z29.9 ENCOUNTER FOR PROPHYLACTIC MEASURES, UNSPECIFIED: ICD-10-CM

## 2023-01-27 DIAGNOSIS — F10.939 ALCOHOL USE, UNSPECIFIED WITH WITHDRAWAL, UNSPECIFIED: ICD-10-CM

## 2023-01-27 DIAGNOSIS — G47.00 INSOMNIA, UNSPECIFIED: ICD-10-CM

## 2023-01-27 DIAGNOSIS — F41.9 ANXIETY DISORDER, UNSPECIFIED: ICD-10-CM

## 2023-01-27 DIAGNOSIS — F13.939 SEDATIVE, HYPNOTIC OR ANXIOLYTIC USE, UNSPECIFIED WITH WITHDRAWAL, UNSPECIFIED: ICD-10-CM

## 2023-01-27 DIAGNOSIS — Z02.9 ENCOUNTER FOR ADMINISTRATIVE EXAMINATIONS, UNSPECIFIED: ICD-10-CM

## 2023-01-27 DIAGNOSIS — F10.239 ALCOHOL DEPENDENCE WITH WITHDRAWAL, UNSPECIFIED: ICD-10-CM

## 2023-01-27 DIAGNOSIS — E87.6 HYPOKALEMIA: ICD-10-CM

## 2023-01-27 DIAGNOSIS — E83.42 HYPOMAGNESEMIA: ICD-10-CM

## 2023-01-27 LAB
ALBUMIN SERPL ELPH-MCNC: 2.5 G/DL — LOW (ref 3.5–5)
ALP SERPL-CCNC: 86 U/L — SIGNIFICANT CHANGE UP (ref 40–120)
ALT FLD-CCNC: 21 U/L DA — SIGNIFICANT CHANGE UP (ref 10–60)
ANION GAP SERPL CALC-SCNC: 6 MMOL/L — SIGNIFICANT CHANGE UP (ref 5–17)
AST SERPL-CCNC: 34 U/L — SIGNIFICANT CHANGE UP (ref 10–40)
BILIRUB SERPL-MCNC: 0.3 MG/DL — SIGNIFICANT CHANGE UP (ref 0.2–1.2)
BUN SERPL-MCNC: 7 MG/DL — SIGNIFICANT CHANGE UP (ref 7–18)
CALCIUM SERPL-MCNC: 8.3 MG/DL — LOW (ref 8.4–10.5)
CHLORIDE SERPL-SCNC: 109 MMOL/L — HIGH (ref 96–108)
CK SERPL-CCNC: 45 U/L — SIGNIFICANT CHANGE UP (ref 21–215)
CO2 SERPL-SCNC: 29 MMOL/L — SIGNIFICANT CHANGE UP (ref 22–31)
CREAT SERPL-MCNC: 0.7 MG/DL — SIGNIFICANT CHANGE UP (ref 0.5–1.3)
EGFR: 95 ML/MIN/1.73M2 — SIGNIFICANT CHANGE UP
GLUCOSE SERPL-MCNC: 105 MG/DL — HIGH (ref 70–99)
HCT VFR BLD CALC: 33.9 % — LOW (ref 34.5–45)
HCV AB S/CO SERPL IA: 0.09 S/CO — SIGNIFICANT CHANGE UP (ref 0–0.99)
HCV AB SERPL-IMP: SIGNIFICANT CHANGE UP
HGB BLD-MCNC: 11.1 G/DL — LOW (ref 11.5–15.5)
MAGNESIUM SERPL-MCNC: 2.4 MG/DL — SIGNIFICANT CHANGE UP (ref 1.6–2.6)
MCHC RBC-ENTMCNC: 32.4 PG — SIGNIFICANT CHANGE UP (ref 27–34)
MCHC RBC-ENTMCNC: 32.7 GM/DL — SIGNIFICANT CHANGE UP (ref 32–36)
MCV RBC AUTO: 98.8 FL — SIGNIFICANT CHANGE UP (ref 80–100)
NRBC # BLD: 0 /100 WBCS — SIGNIFICANT CHANGE UP (ref 0–0)
PHOSPHATE SERPL-MCNC: 1.5 MG/DL — LOW (ref 2.5–4.5)
PLATELET # BLD AUTO: 145 K/UL — LOW (ref 150–400)
POTASSIUM SERPL-MCNC: 3.6 MMOL/L — SIGNIFICANT CHANGE UP (ref 3.5–5.3)
POTASSIUM SERPL-SCNC: 3.6 MMOL/L — SIGNIFICANT CHANGE UP (ref 3.5–5.3)
PROT SERPL-MCNC: 5.9 G/DL — LOW (ref 6–8.3)
RBC # BLD: 3.43 M/UL — LOW (ref 3.8–5.2)
RBC # FLD: 15.8 % — HIGH (ref 10.3–14.5)
SODIUM SERPL-SCNC: 144 MMOL/L — SIGNIFICANT CHANGE UP (ref 135–145)
WBC # BLD: 5.94 K/UL — SIGNIFICANT CHANGE UP (ref 3.8–10.5)
WBC # FLD AUTO: 5.94 K/UL — SIGNIFICANT CHANGE UP (ref 3.8–10.5)

## 2023-01-27 PROCEDURE — 90792 PSYCH DIAG EVAL W/MED SRVCS: CPT | Mod: 1L,95

## 2023-01-27 RX ORDER — ENOXAPARIN SODIUM 100 MG/ML
40 INJECTION SUBCUTANEOUS EVERY 24 HOURS
Refills: 0 | Status: DISCONTINUED | OUTPATIENT
Start: 2023-01-27 | End: 2023-01-29

## 2023-01-27 RX ORDER — NYSTATIN CREAM 100000 [USP'U]/G
1 CREAM TOPICAL
Refills: 0 | Status: DISCONTINUED | OUTPATIENT
Start: 2023-01-27 | End: 2023-01-29

## 2023-01-27 RX ORDER — ZOLPIDEM TARTRATE 10 MG/1
5 TABLET ORAL ONCE
Refills: 0 | Status: DISCONTINUED | OUTPATIENT
Start: 2023-01-27 | End: 2023-01-27

## 2023-01-27 RX ORDER — ZOLPIDEM TARTRATE 10 MG/1
0 TABLET ORAL
Qty: 0 | Refills: 0 | DISCHARGE

## 2023-01-27 RX ORDER — ZOLPIDEM TARTRATE 10 MG/1
1 TABLET ORAL
Qty: 0 | Refills: 0 | DISCHARGE

## 2023-01-27 RX ORDER — ZOLPIDEM TARTRATE 10 MG/1
5 TABLET ORAL AT BEDTIME
Refills: 0 | Status: DISCONTINUED | OUTPATIENT
Start: 2023-01-27 | End: 2023-01-29

## 2023-01-27 RX ORDER — SODIUM CHLORIDE 9 MG/ML
1000 INJECTION INTRAMUSCULAR; INTRAVENOUS; SUBCUTANEOUS
Refills: 0 | Status: DISCONTINUED | OUTPATIENT
Start: 2023-01-27 | End: 2023-01-28

## 2023-01-27 RX ORDER — PREGABALIN 225 MG/1
1000 CAPSULE ORAL DAILY
Refills: 0 | Status: DISCONTINUED | OUTPATIENT
Start: 2023-01-27 | End: 2023-01-29

## 2023-01-27 RX ORDER — SERTRALINE 25 MG/1
0 TABLET, FILM COATED ORAL
Qty: 0 | Refills: 0 | DISCHARGE

## 2023-01-27 RX ORDER — ONDANSETRON 8 MG/1
4 TABLET, FILM COATED ORAL EVERY 8 HOURS
Refills: 0 | Status: DISCONTINUED | OUTPATIENT
Start: 2023-01-27 | End: 2023-01-29

## 2023-01-27 RX ORDER — GABAPENTIN 400 MG/1
100 CAPSULE ORAL
Refills: 0 | Status: DISCONTINUED | OUTPATIENT
Start: 2023-01-27 | End: 2023-01-29

## 2023-01-27 RX ORDER — ATORVASTATIN CALCIUM 80 MG/1
0 TABLET, FILM COATED ORAL
Qty: 0 | Refills: 2 | DISCHARGE

## 2023-01-27 RX ORDER — ALPRAZOLAM 0.25 MG
1 TABLET ORAL THREE TIMES A DAY
Refills: 0 | Status: DISCONTINUED | OUTPATIENT
Start: 2023-01-27 | End: 2023-01-28

## 2023-01-27 RX ORDER — FOLIC ACID 0.8 MG
1 TABLET ORAL
Qty: 0 | Refills: 2 | DISCHARGE

## 2023-01-27 RX ORDER — ALPRAZOLAM 0.25 MG
1 TABLET ORAL THREE TIMES A DAY
Refills: 0 | Status: DISCONTINUED | OUTPATIENT
Start: 2023-01-27 | End: 2023-01-27

## 2023-01-27 RX ORDER — FOLIC ACID 0.8 MG
0 TABLET ORAL
Qty: 0 | Refills: 2 | DISCHARGE

## 2023-01-27 RX ORDER — ACETAMINOPHEN 500 MG
650 TABLET ORAL EVERY 6 HOURS
Refills: 0 | Status: DISCONTINUED | OUTPATIENT
Start: 2023-01-27 | End: 2023-01-29

## 2023-01-27 RX ORDER — FOLIC ACID 0.8 MG
1 TABLET ORAL DAILY
Refills: 0 | Status: DISCONTINUED | OUTPATIENT
Start: 2023-01-27 | End: 2023-01-29

## 2023-01-27 RX ORDER — THIAMINE MONONITRATE (VIT B1) 100 MG
100 TABLET ORAL DAILY
Refills: 0 | Status: DISCONTINUED | OUTPATIENT
Start: 2023-01-27 | End: 2023-01-29

## 2023-01-27 RX ORDER — SERTRALINE 25 MG/1
100 TABLET, FILM COATED ORAL ONCE
Refills: 0 | Status: COMPLETED | OUTPATIENT
Start: 2023-01-27 | End: 2023-01-27

## 2023-01-27 RX ORDER — ATORVASTATIN CALCIUM 80 MG/1
1 TABLET, FILM COATED ORAL
Qty: 0 | Refills: 2 | DISCHARGE

## 2023-01-27 RX ORDER — ATORVASTATIN CALCIUM 80 MG/1
10 TABLET, FILM COATED ORAL AT BEDTIME
Refills: 0 | Status: DISCONTINUED | OUTPATIENT
Start: 2023-01-27 | End: 2023-01-29

## 2023-01-27 RX ORDER — GABAPENTIN 400 MG/1
1 CAPSULE ORAL
Qty: 0 | Refills: 0 | DISCHARGE

## 2023-01-27 RX ORDER — SERTRALINE 25 MG/1
100 TABLET, FILM COATED ORAL DAILY
Refills: 0 | Status: DISCONTINUED | OUTPATIENT
Start: 2023-01-27 | End: 2023-01-29

## 2023-01-27 RX ORDER — POTASSIUM PHOSPHATE, MONOBASIC POTASSIUM PHOSPHATE, DIBASIC 236; 224 MG/ML; MG/ML
15 INJECTION, SOLUTION INTRAVENOUS ONCE
Refills: 0 | Status: COMPLETED | OUTPATIENT
Start: 2023-01-27 | End: 2023-01-27

## 2023-01-27 RX ORDER — GABAPENTIN 400 MG/1
0 CAPSULE ORAL
Qty: 0 | Refills: 0 | DISCHARGE

## 2023-01-27 RX ADMIN — ENOXAPARIN SODIUM 40 MILLIGRAM(S): 100 INJECTION SUBCUTANEOUS at 02:35

## 2023-01-27 RX ADMIN — SODIUM CHLORIDE 1000 MILLILITER(S): 9 INJECTION INTRAMUSCULAR; INTRAVENOUS; SUBCUTANEOUS at 02:22

## 2023-01-27 RX ADMIN — ZOLPIDEM TARTRATE 5 MILLIGRAM(S): 10 TABLET ORAL at 22:42

## 2023-01-27 RX ADMIN — Medication 1 MILLIGRAM(S): at 11:12

## 2023-01-27 RX ADMIN — PREGABALIN 1000 MICROGRAM(S): 225 CAPSULE ORAL at 11:11

## 2023-01-27 RX ADMIN — ZOLPIDEM TARTRATE 5 MILLIGRAM(S): 10 TABLET ORAL at 04:30

## 2023-01-27 RX ADMIN — SERTRALINE 100 MILLIGRAM(S): 25 TABLET, FILM COATED ORAL at 02:35

## 2023-01-27 RX ADMIN — Medication 100 MILLIGRAM(S): at 11:13

## 2023-01-27 RX ADMIN — NYSTATIN CREAM 1 APPLICATION(S): 100000 CREAM TOPICAL at 22:40

## 2023-01-27 RX ADMIN — Medication 1 MILLIGRAM(S): at 22:38

## 2023-01-27 RX ADMIN — Medication 1 TABLET(S): at 11:11

## 2023-01-27 RX ADMIN — ATORVASTATIN CALCIUM 10 MILLIGRAM(S): 80 TABLET, FILM COATED ORAL at 22:39

## 2023-01-27 RX ADMIN — SERTRALINE 100 MILLIGRAM(S): 25 TABLET, FILM COATED ORAL at 11:10

## 2023-01-27 RX ADMIN — Medication 100 MILLIEQUIVALENT(S): at 00:23

## 2023-01-27 RX ADMIN — POTASSIUM PHOSPHATE, MONOBASIC POTASSIUM PHOSPHATE, DIBASIC 62.5 MILLIMOLE(S): 236; 224 INJECTION, SOLUTION INTRAVENOUS at 09:37

## 2023-01-27 RX ADMIN — Medication 1 MILLIGRAM(S): at 13:28

## 2023-01-27 RX ADMIN — Medication 50 GRAM(S): at 01:45

## 2023-01-27 RX ADMIN — SODIUM CHLORIDE 80 MILLILITER(S): 9 INJECTION INTRAMUSCULAR; INTRAVENOUS; SUBCUTANEOUS at 16:52

## 2023-01-27 RX ADMIN — Medication 10 MILLIEQUIVALENT(S): at 03:02

## 2023-01-27 RX ADMIN — ZOLPIDEM TARTRATE 5 MILLIGRAM(S): 10 TABLET ORAL at 02:37

## 2023-01-27 NOTE — PROGRESS NOTE ADULT - PROBLEM SELECTOR PLAN 3
- UA positive  - Denies dysuria, denies increased frequency - UA positive  - Denies dysuria, denies increased frequency  - Monitor off Abx  - Encourage fluids

## 2023-01-27 NOTE — BH CONSULTATION LIAISON ASSESSMENT NOTE - NSBHCHARTREVIEWVS_PSY_A_CORE FT
Vital Signs Last 24 Hrs  T(C): 37.1 (27 Jan 2023 04:44), Max: 37.3 (27 Jan 2023 00:44)  T(F): 98.7 (27 Jan 2023 04:44), Max: 99.1 (27 Jan 2023 00:44)  HR: 69 (27 Jan 2023 04:44) (64 - 78)  BP: 152/77 (27 Jan 2023 04:44) (143/78 - 177/82)  BP(mean): --  RR: 18 (27 Jan 2023 04:44) (18 - 25)  SpO2: 97% (27 Jan 2023 04:44) (97% - 98%)    Parameters below as of 27 Jan 2023 04:44  Patient On (Oxygen Delivery Method): room air

## 2023-01-27 NOTE — PROGRESS NOTE ADULT - PROBLEM SELECTOR PLAN 11
-Pt has a long history of alcohol abuse  - Will need out/pt alcohol rehab program after discharge  - AMADEO consulted

## 2023-01-27 NOTE — PROGRESS NOTE ADULT - PROBLEM SELECTOR PLAN 6
- Home med Sertraline 100mg  - Has outpt therapist  - Telepsych consult please - C/W Home med Sertraline 100mg as per Psych  - Has outpt therapist  - Telepsych following

## 2023-01-27 NOTE — BH CONSULTATION LIAISON ASSESSMENT NOTE - HPI (INCLUDE ILLNESS QUALITY, SEVERITY, DURATION, TIMING, CONTEXT, MODIFYING FACTORS, ASSOCIATED SIGNS AND SYMPTOMS)
12/27/2022	12/28/2022	alprazolam 1 mg tablet	90	30	Aníbal Hess MD	[id number]	Medicare	[facility] #[id number]  12/27/2022	12/28/2022	zolpidem tartrate 10 mg tablet	30	30	Aníbal Hess MD	[id number]	Medicare	[facility] #[id number]  11/26/2022	11/29/2022	zolpidem tartrate 10 mg tablet	30	30	Aníbal Hess MD	[id number]	Medicare	[facility] #[id number]  11/26/2022	11/29/2022	alprazolam 1 mg tablet	90	30	Aníbal Hess MD	[id number]	Medicare	[facility] #[id number]  10/21/2022	10/28/2022	alprazolam 1 mg tablet	90	30	Aníbal Hess MD	[id number]	Medicare	[facility] #[id number]  10/21/2022	10/28/2022	zolpidem tartrate 10 mg tablet	30	30	Aníbal Hess MD	[id number]	Medicare	[facility] #[id number]  09/24/2022	09/29/2022	alprazolam 1 mg tablet	90	30	Aníbal Hess MD	[id number]	Medicare	[id number]  09/24/2022	09/29/2022	zolpidem tartrate 10 mg tablet	30	30	Aníbal Hess MD	[id number]	Medicare	[id number] 67F lives alone retired, no prior psychiatric hospitalizations/suicidality; hx of depression and anxiety; hx of alcohol abuse; who   was admitted for bzd w/d.    Patient sees a psychiatrist Dr. Hess in [place], who prescribes xanax 1mg po tid (along with ambien 10mg qhs, and zoloft 100mg)  which is confirmed on istop below.    She has been on xanax since 2008, and says it is the only thing that helps with her anxiety; she is not interested in stopping it.     she says she took an extra xanax dose a few times in the past month due to anxiety and depression over the recent loss of her son, at the direction   of her psychiatrist. as a result, she ran out three days early. she says this "never happens."    she experience sx's of bzd w/d as a result, including shaking, tremors, dizziness, VH.     sx's are resolved now. [id number]    she reports sx's of depression including poor sleep but denies hopelessness or suicidal ideation. has supports in sister and brother.     she is open to treatment for etoh use as she relapsed after a period of time sober.       12/27/2022	12/28/2022	alprazolam 1 mg tablet	90	30	Aníbal Hess MD	[id number]	Medicare	[id number]  12/27/2022	12/28/2022	zolpidem tartrate 10 mg tablet	30	30	Aníbal Hess MD	[id number]	Medicare	[id number]  11/26/2022	11/29/2022	zolpidem tartrate 10 mg tablet	30	30	Aníbal Hess MD	[id number]	Medicare	[facility] #[id number]  11/26/2022	11/29/2022	alprazolam 1 mg tablet	90	30	Aníbal Hess MD	[id number]	Medicare	[id number]  10/21/2022	10/28/2022	alprazolam 1 mg tablet	90	30	Aníbal Hess MD	[id number]	Medicare	[facility] #[id number]  10/21/2022	10/28/2022	zolpidem tartrate 10 mg tablet	30	30	Aníbal Hess MD	[id number]	Medicare	[facility] #[id number]  09/24/2022	09/29/2022	alprazolam 1 mg tablet	90	30	Aníbal Hess MD	[id number]	Medicare	[id number]  09/24/2022	09/29/2022	zolpidem tartrate 10 mg tablet	30	30	Aníbal Hess MD	[id number]	Medicare	[facility] #[id number]

## 2023-01-27 NOTE — BH CONSULTATION LIAISON ASSESSMENT NOTE - CURRENT MEDICATION
MEDICATIONS  (STANDING):  atorvastatin 10 milliGRAM(s) Oral at bedtime  cyanocobalamin 1000 MICROGram(s) Oral daily  enoxaparin Injectable 40 milliGRAM(s) SubCutaneous every 24 hours  folic acid 1 milliGRAM(s) Oral daily  multivitamin 1 Tablet(s) Oral daily  potassium chloride  10 mEq/100 mL IVPB 10 milliEquivalent(s) IV Intermittent every 1 hour  potassium phosphate IVPB 15 milliMole(s) IV Intermittent once  sertraline 100 milliGRAM(s) Oral daily  sodium chloride 0.9%. 1000 milliLiter(s) (80 mL/Hr) IV Continuous <Continuous>  thiamine 100 milliGRAM(s) Oral daily    MEDICATIONS  (PRN):  acetaminophen     Tablet .. 650 milliGRAM(s) Oral every 6 hours PRN Temp greater or equal to 38C (100.4F), Mild Pain (1 - 3)  aluminum hydroxide/magnesium hydroxide/simethicone Suspension 30 milliLiter(s) Oral every 4 hours PRN Dyspepsia  gabapentin 100 milliGRAM(s) Oral two times a day PRN sciatica  LORazepam   Injectable 2 milliGRAM(s) IV Push every 2 hours PRN CIWA-Ar score 8 or greater  ondansetron Injectable 4 milliGRAM(s) IV Push every 8 hours PRN Nausea and/or Vomiting  zolpidem 5 milliGRAM(s) Oral at bedtime PRN Insomnia  zolpidem 5 milliGRAM(s) Oral at bedtime PRN Insomnia

## 2023-01-27 NOTE — PROGRESS NOTE ADULT - NS ATTEND AMEND GEN_ALL_CORE FT
Patient seen/evaluated at bedside on 1/27/2023. I agree with the NP progress note/outlined plan of care. My independent findings and conclusions are documented.    S: Feels better s/p dose of xanax 1mg . Still with mild tremors. Pt hopeful to leave tomorrow. Seen by behavioral health this morning rec resumption of xanax. Denies nausea/vomiting, dysuria, urinary frequency/fevers/chills.    1. benzodiazepine/alcohol withdrawal  2. generalized anxiety  3. elevated blood pressure  4. hypokalemia    67 year old woman with PMH of chronic alcohol abuse with w/ h/o 2 ETOH rehab courses and remote history of , panic / anxiety disorder,- chronically on xanax 1mg tid, continues to grieve due to loss of only child brought in by EMS for dizzy and panic spells, muscle spasms while waiting to  her prescription for Xanax, zoloft and Ambien. Of note, she drinks alcohol and uses her medications  as well daily but has not used them in the last few days. Ran out of xanax 3 days ago and symptomology consistent with BZD/ETOH withdrawal.  In the ED hypertensive with systolic in the 190s, administered lorazepam 1mg IV x 2 doses.  Patient has no history of HTN per her report. No alcohol level was available; was not place on scheduled BZD on admission. Assessed by behavioral  health who recommended resumption of  Xanax 1 mg TID, however, may need higher doses of BZD.  Xanax 1 mg is approximately  approx equivalent to librium 50mg though shorter acting- can likely tolerate librium for smoother detox. LFTs within normal limitis. Drinks 4 "Fireballs" alcoholic beverages per sitting/day. No h/o seizures/ICU courses. Noted with asymptomatic bacteriuria.    Patient tearful, still grieving and appears to be self medicating w/ continued ETOH, xanax and ambien usage. She declined social work referrals and wants to pursue Alcoholics Anonymous sessions at a site she has already selected.     -f/u electrolytes potassium/serum  -adjust BZD, the psych recommended xanax 1mg tid, though this may not control symptomology  -ambulate in the morning  -behavioral health f/u if any

## 2023-01-27 NOTE — PATIENT PROFILE ADULT - FUNCTIONAL ASSESSMENT - BASIC MOBILITY SCORE.
I have reviewed discharge instructions with the patient. The patient verbalized understanding. Patient left ED via Discharge Method: ambulatory to Home with family/friend. Opportunity for questions and clarification provided. Patient given 1 script. To continue your aftercare when you leave the hospital, you may receive an automated call from our care team to check in on how you are doing. This is a free service and part of our promise to provide the best care and service to meet your aftercare needs.  If you have questions, or wish to unsubscribe from this service please call [phone]. Thank you for Choosing our [facility] Emergency Department. 22

## 2023-01-27 NOTE — BH CONSULTATION LIAISON ASSESSMENT NOTE - RISK ASSESSMENT
low acute: denies si, is future oriented, wants help, has support, has providers,  medical condition being treated  low chronic: no prior hospitaizations/SI/SA    pt's recent loss is a risk factor but acute risk is not elevated

## 2023-01-27 NOTE — PROGRESS NOTE ADULT - PROBLEM SELECTOR PLAN 4
- Home med Xanax 1mg TID for years  - Has outpt therapist - Home med Xanax 1mg TID for years  - Has outpt therapist  - C/W Xanax as per Psych

## 2023-01-27 NOTE — PROGRESS NOTE ADULT - PROBLEM SELECTOR PLAN 2
- Quit alcohol but relapsed in Feb 2021 when her son passed away. Also resumed smoking tobacco at the same time  - S/p Ativan in ED  - CIWA started with PRN ativan  - Thiamine, folic acid, multivitamin, cyanocobalamin  - Telepsych can resume patient's benzodiazepine (Xanax) or recommmend other - Quit alcohol but relapsed in Feb 2021 when her son passed away. Also resumed smoking tobacco at the same time  - S/p Ativan in ED  - CIWA started with PRN ativan  - Thiamine, folic acid, multivitamin, cyanocobalamin  - Telepsych following  - SW consulted

## 2023-01-27 NOTE — PATIENT PROFILE ADULT - FALL HARM RISK - HARM RISK INTERVENTIONS
Assistance with ambulation/Assistance OOB with selected safe patient handling equipment/Communicate Risk of Fall with Harm to all staff/Monitor for mental status changes/Monitor gait and stability/Reinforce activity limits and safety measures with patient and family/Tailored Fall Risk Interventions/Toileting schedule using arm’s reach rule for commode and bathroom/Use of alarms - bed, chair and/or voice tab/Visual Cue: Yellow wristband and red socks/Bed in lowest position, wheels locked, appropriate side rails in place/Call bell, personal items and telephone in reach/Instruct patient to call for assistance before getting out of bed or chair/Non-slip footwear when patient is out of bed/[place] to call system/Physically safe environment - no spills, clutter or unnecessary equipment/Purposeful Proactive Rounding/Room/bathroom lighting operational, light cord in reach

## 2023-01-27 NOTE — BH CONSULTATION LIAISON ASSESSMENT NOTE - SUMMARY
67F lives alone retired, no prior psychiatric hospitalizations/suicidality; hx of depression and anxiety; hx of alcohol abuse; who   was admitted for bzd w/d.    pt ran out of xanax three days early after using it prn at the direction of her psychiatrist which resulted in three days of   no access to mediation and resultant bzd w/d sx's.     istop confirms regular xanax presciption from psychiatrist Dr. Hess.   pt has been on xanax since 2008 with no interest in stopping and says it helps with her anxiety.     despite recent loss, pt denies hopelessness or any suicidal ideation, she is not an acute danger, inpatient hospitalization not indicated.     would recommend:    1. restart xanax 1mg po tid (pt aware of risks of using etoh with bzds, and declines bzd detox)  2. pt is open to treatment for alcohol abuse. have SW/SBIRT meet with patient for inpatient or outpatient etoh rehab  3. cont zoloft 100mg qd  4. pt may f/u with psychiatrist Dr. Hess as an outpatient

## 2023-01-27 NOTE — PROGRESS NOTE ADULT - PROBLEM SELECTOR PLAN 1
- Patient ran out of her Xanax for 3 days  - Had withdrawal psychosis (visual hallucination), elevated BP, tachycardia, dizziness, tremor, seizure?  - C/w Xanax as per psych - Patient ran out of her Xanax for 3 days  - Had withdrawal psychosis (visual hallucination), elevated BP, tachycardia, dizziness, tremor.  - C/w Xanax as per psych

## 2023-01-28 LAB
ANION GAP SERPL CALC-SCNC: 5 MMOL/L — SIGNIFICANT CHANGE UP (ref 5–17)
BUN SERPL-MCNC: 8 MG/DL — SIGNIFICANT CHANGE UP (ref 7–18)
CALCIUM SERPL-MCNC: 9 MG/DL — SIGNIFICANT CHANGE UP (ref 8.4–10.5)
CHLORIDE SERPL-SCNC: 111 MMOL/L — HIGH (ref 96–108)
CO2 SERPL-SCNC: 31 MMOL/L — SIGNIFICANT CHANGE UP (ref 22–31)
CREAT SERPL-MCNC: 0.58 MG/DL — SIGNIFICANT CHANGE UP (ref 0.5–1.3)
EGFR: 99 ML/MIN/1.73M2 — SIGNIFICANT CHANGE UP
GLUCOSE SERPL-MCNC: 98 MG/DL — SIGNIFICANT CHANGE UP (ref 70–99)
HCT VFR BLD CALC: 33.7 % — LOW (ref 34.5–45)
HGB BLD-MCNC: 11 G/DL — LOW (ref 11.5–15.5)
MAGNESIUM SERPL-MCNC: 2.3 MG/DL — SIGNIFICANT CHANGE UP (ref 1.6–2.6)
MCHC RBC-ENTMCNC: 32.4 PG — SIGNIFICANT CHANGE UP (ref 27–34)
MCHC RBC-ENTMCNC: 32.6 GM/DL — SIGNIFICANT CHANGE UP (ref 32–36)
MCV RBC AUTO: 99.1 FL — SIGNIFICANT CHANGE UP (ref 80–100)
NRBC # BLD: 0 /100 WBCS — SIGNIFICANT CHANGE UP (ref 0–0)
PHOSPHATE SERPL-MCNC: 2.3 MG/DL — LOW (ref 2.5–4.5)
PLATELET # BLD AUTO: 140 K/UL — LOW (ref 150–400)
POTASSIUM SERPL-MCNC: 3.4 MMOL/L — LOW (ref 3.5–5.3)
POTASSIUM SERPL-SCNC: 3.4 MMOL/L — LOW (ref 3.5–5.3)
RBC # BLD: 3.4 M/UL — LOW (ref 3.8–5.2)
RBC # FLD: 15.9 % — HIGH (ref 10.3–14.5)
SODIUM SERPL-SCNC: 147 MMOL/L — HIGH (ref 135–145)
WBC # BLD: 4.8 K/UL — SIGNIFICANT CHANGE UP (ref 3.8–10.5)
WBC # FLD AUTO: 4.8 K/UL — SIGNIFICANT CHANGE UP (ref 3.8–10.5)

## 2023-01-28 PROCEDURE — 99232 SBSQ HOSP IP/OBS MODERATE 35: CPT

## 2023-01-28 RX ORDER — POTASSIUM CHLORIDE 20 MEQ
20 PACKET (EA) ORAL ONCE
Refills: 0 | Status: COMPLETED | OUTPATIENT
Start: 2023-01-28 | End: 2023-01-28

## 2023-01-28 RX ORDER — POTASSIUM PHOSPHATE, MONOBASIC POTASSIUM PHOSPHATE, DIBASIC 236; 224 MG/ML; MG/ML
15 INJECTION, SOLUTION INTRAVENOUS ONCE
Refills: 0 | Status: COMPLETED | OUTPATIENT
Start: 2023-01-28 | End: 2023-01-28

## 2023-01-28 RX ADMIN — SERTRALINE 100 MILLIGRAM(S): 25 TABLET, FILM COATED ORAL at 11:35

## 2023-01-28 RX ADMIN — ATORVASTATIN CALCIUM 10 MILLIGRAM(S): 80 TABLET, FILM COATED ORAL at 22:21

## 2023-01-28 RX ADMIN — POTASSIUM PHOSPHATE, MONOBASIC POTASSIUM PHOSPHATE, DIBASIC 62.5 MILLIMOLE(S): 236; 224 INJECTION, SOLUTION INTRAVENOUS at 10:54

## 2023-01-28 RX ADMIN — ENOXAPARIN SODIUM 40 MILLIGRAM(S): 100 INJECTION SUBCUTANEOUS at 06:06

## 2023-01-28 RX ADMIN — Medication 1 TABLET(S): at 11:36

## 2023-01-28 RX ADMIN — Medication 75 MILLIGRAM(S): at 13:10

## 2023-01-28 RX ADMIN — NYSTATIN CREAM 1 APPLICATION(S): 100000 CREAM TOPICAL at 06:05

## 2023-01-28 RX ADMIN — ZOLPIDEM TARTRATE 5 MILLIGRAM(S): 10 TABLET ORAL at 22:21

## 2023-01-28 RX ADMIN — Medication 75 MILLIGRAM(S): at 22:20

## 2023-01-28 RX ADMIN — Medication 1 MILLIGRAM(S): at 11:36

## 2023-01-28 RX ADMIN — NYSTATIN CREAM 1 APPLICATION(S): 100000 CREAM TOPICAL at 18:29

## 2023-01-28 RX ADMIN — Medication 100 MILLIGRAM(S): at 11:36

## 2023-01-28 RX ADMIN — Medication 1 MILLIGRAM(S): at 06:05

## 2023-01-28 RX ADMIN — Medication 20 MILLIEQUIVALENT(S): at 09:43

## 2023-01-28 RX ADMIN — PREGABALIN 1000 MICROGRAM(S): 225 CAPSULE ORAL at 11:36

## 2023-01-28 NOTE — PROGRESS NOTE ADULT - SUBJECTIVE AND OBJECTIVE BOX
Patient is a 67y old  Female who presents with a chief complaint of Benzodiazepine withdrawal (2023 16:52)      INTERVAL HPI/OVERNIGHT EVENTS: Patient was seen and examined, reports feeling slightly better but tired. She denies having any tremors, diarrhea, palpitations. patient got emotional while describing her symptoms and was tearful      MEDICATIONS  (STANDING):  atorvastatin 10 milliGRAM(s) Oral at bedtime  chlordiazePOXIDE 75 milliGRAM(s) Oral every 8 hours  cyanocobalamin 1000 MICROGram(s) Oral daily  enoxaparin Injectable 40 milliGRAM(s) SubCutaneous every 24 hours  folic acid 1 milliGRAM(s) Oral daily  multivitamin 1 Tablet(s) Oral daily  nystatin Powder 1 Application(s) Topical two times a day  potassium chloride  10 mEq/100 mL IVPB 10 milliEquivalent(s) IV Intermittent every 1 hour  sertraline 100 milliGRAM(s) Oral daily  sodium chloride 0.9%. 1000 milliLiter(s) (80 mL/Hr) IV Continuous <Continuous>  thiamine 100 milliGRAM(s) Oral daily    MEDICATIONS  (PRN):  acetaminophen     Tablet .. 650 milliGRAM(s) Oral every 6 hours PRN Temp greater or equal to 38C (100.4F), Mild Pain (1 - 3)  aluminum hydroxide/magnesium hydroxide/simethicone Suspension 30 milliLiter(s) Oral every 4 hours PRN Dyspepsia  gabapentin 100 milliGRAM(s) Oral two times a day PRN sciatica  LORazepam   Injectable 2 milliGRAM(s) IV Push every 2 hours PRN CIWA 7 or greater  ondansetron Injectable 4 milliGRAM(s) IV Push every 8 hours PRN Nausea and/or Vomiting  zolpidem 5 milliGRAM(s) Oral at bedtime PRN Insomnia  zolpidem 5 milliGRAM(s) Oral at bedtime PRN Insomnia      __________________________________________________  REVIEW OF SYSTEMS:    CONSTITUTIONAL: No fever, fatigue+   EYES: no acute visual disturbances  NECK: No pain or stiffness  RESPIRATORY: No cough; No shortness of breath  CARDIOVASCULAR: No chest pain, no palpitations  GASTROINTESTINAL: No pain. No nausea or vomiting; No diarrhea   NEUROLOGICAL: No headache or numbness, no tremors  MUSCULOSKELETAL: No joint pain, no muscle pain  GENITOURINARY: no dysuria, no frequency, no hesitancy  PSYCHIATRY: depression+  ALL OTHER  ROS negative        Vital Signs Last 24 Hrs  T(C): 36.7 (2023 12:40), Max: 36.9 (2023 20:58)  T(F): 98 (2023 12:40), Max: 98.5 (2023 20:58)  HR: 71 (2023 12:40) (62 - 71)  BP: 159/59 (2023 12:40) (159/59 - 190/76)  BP(mean): --  RR: 18 (2023 12:40) (18 - 18)  SpO2: 97% (2023 12:40) (95% - 97%)    Parameters below as of 2023 12:40  Patient On (Oxygen Delivery Method): room air        ________________________________________________  PHYSICAL EXAM:  GENERAL: NAD  HEENT: Normocephalic;  conjunctivae and sclerae clear; moist mucous membranes;   NECK : supple  CHEST/LUNG: Clear to auscultation bilaterally with good air entry   HEART: S1 S2  regular; no murmurs, gallops or rubs  ABDOMEN: Soft, Nontender, Nondistended; Bowel sounds present  EXTREMITIES: no cyanosis; no edema; no calf tenderness  SKIN: warm and dry; no rash  NERVOUS SYSTEM:  Awake and alert; Oriented  to place, person and time ; no new deficits, tearful and emotional     _________________________________________________  LABS:                        11.0   4.80  )-----------( 140      ( 2023 05:40 )             33.7         147<H>  |  111<H>  |  8   ----------------------------<  98  3.4<L>   |  31  |  0.58    Ca    9.0      2023 05:40  Phos  2.3       Mg     2.3         TPro  5.9<L>  /  Alb  2.5<L>  /  TBili  0.3  /  DBili  x   /  AST  34  /  ALT  21  /  AlkPhos  86        Urinalysis Basic - ( 2023 21:07 )    Color: Yellow / Appearance: Clear / S.015 / pH: x  Gluc: x / Ketone: Trace  / Bili: Negative / Urobili: Negative   Blood: x / Protein: 30 mg/dL / Nitrite: Negative   Leuk Esterase: Moderate / RBC: 0-2 /HPF / WBC 11-25 /HPF   Sq Epi: x / Non Sq Epi: Many /HPF / Bacteria: Moderate /HPF      CAPILLARY BLOOD GLUCOSE            RADIOLOGY & ADDITIONAL TESTS:    Imaging Personally Reviewed:  YES    Consultant(s) Notes Reviewed:   YES    Care Discussed with Consultants : YES     Plan of care was discussed with patient and /or primary care giver; all questions and concerns were addressed and care was aligned with patient's wishes.    
NP Note discussed with  primary attending    Patient is a 67y old  Female who presents with a chief complaint of Benzodiazepine withdrawal (2023 23:44)      INTERVAL HPI/OVERNIGHT EVENTS: no new complaints    MEDICATIONS  (STANDING):  ALPRAZolam 1 milliGRAM(s) Oral three times a day  atorvastatin 10 milliGRAM(s) Oral at bedtime  cyanocobalamin 1000 MICROGram(s) Oral daily  enoxaparin Injectable 40 milliGRAM(s) SubCutaneous every 24 hours  folic acid 1 milliGRAM(s) Oral daily  multivitamin 1 Tablet(s) Oral daily  potassium chloride  10 mEq/100 mL IVPB 10 milliEquivalent(s) IV Intermittent every 1 hour  sertraline 100 milliGRAM(s) Oral daily  sodium chloride 0.9%. 1000 milliLiter(s) (80 mL/Hr) IV Continuous <Continuous>  thiamine 100 milliGRAM(s) Oral daily    MEDICATIONS  (PRN):  acetaminophen     Tablet .. 650 milliGRAM(s) Oral every 6 hours PRN Temp greater or equal to 38C (100.4F), Mild Pain (1 - 3)  aluminum hydroxide/magnesium hydroxide/simethicone Suspension 30 milliLiter(s) Oral every 4 hours PRN Dyspepsia  gabapentin 100 milliGRAM(s) Oral two times a day PRN sciatica  LORazepam   Injectable 2 milliGRAM(s) IV Push every 2 hours PRN CIWA-Ar score 8 or greater  ondansetron Injectable 4 milliGRAM(s) IV Push every 8 hours PRN Nausea and/or Vomiting  zolpidem 5 milliGRAM(s) Oral at bedtime PRN Insomnia  zolpidem 5 milliGRAM(s) Oral at bedtime PRN Insomnia      __________________________________________________  REVIEW OF SYSTEMS:    CONSTITUTIONAL: No fever,   EYES: no acute visual disturbances  NECK: No pain or stiffness  RESPIRATORY: No cough; No shortness of breath  CARDIOVASCULAR: No chest pain, no palpitations  GASTROINTESTINAL: No pain. No nausea or vomiting; No diarrhea   NEUROLOGICAL: No headache or numbness, no tremors  MUSCULOSKELETAL: No joint pain, no muscle pain  GENITOURINARY: no dysuria, no frequency, no hesitancy  PSYCHIATRY: no depression , no anxiety  ALL OTHER  ROS negative        Vital Signs Last 24 Hrs  T(C): 37.1 (2023 12:45), Max: 37.3 (2023 00:44)  T(F): 98.7 (2023 12:45), Max: 99.1 (2023 00:44)  HR: 69 (2023 12:45) (64 - 78)  BP: 152/87 (2023 12:45) (143/78 - 177/82)  BP(mean): --  RR: 18 (2023 12:45) (18 - 25)  SpO2: 97% (2023 12:45) (97% - 98%)    Parameters below as of 2023 12:45  Patient On (Oxygen Delivery Method): room air        ________________________________________________  PHYSICAL EXAM:  GENERAL: NAD  HEENT: Normocephalic;  conjunctivae and sclerae clear; moist mucous membranes;   NECK : supple  CHEST/LUNG: Clear to ausculitation bilaterally with good air entry   HEART: S1 S2  regular; no murmurs, gallops or rubs  ABDOMEN: Soft, Nontender, Nondistended; Bowel sounds present  EXTREMITIES: no cyanosis; no edema; no calf tenderness  SKIN: warm and dry; no rash  NERVOUS SYSTEM:  Awake and alert; Oriented  to place, person and time ; no new deficits    _________________________________________________  LABS:                        11.1   5.94  )-----------( 145      ( 2023 05:35 )             33.9         144  |  109<H>  |  7   ----------------------------<  105<H>  3.6   |  29  |  0.70    Ca    8.3<L>      2023 05:35  Phos  1.5       Mg     2.4         TPro  5.9<L>  /  Alb  2.5<L>  /  TBili  0.3  /  DBili  x   /  AST  34  /  ALT  21  /  AlkPhos  86        Urinalysis Basic - ( 2023 21:07 )    Color: Yellow / Appearance: Clear / S.015 / pH: x  Gluc: x / Ketone: Trace  / Bili: Negative / Urobili: Negative   Blood: x / Protein: 30 mg/dL / Nitrite: Negative   Leuk Esterase: Moderate / RBC: 0-2 /HPF / WBC 11-25 /HPF   Sq Epi: x / Non Sq Epi: Many /HPF / Bacteria: Moderate /HPF      CAPILLARY BLOOD GLUCOSE            RADIOLOGY & ADDITIONAL TESTS:  < from: CT Head No Cont (23 @ 20:44) >    There is no acute intracranial hemorrhage or mass effect. There are areas   of hypodensity in the bilateral hemispheric white matter suggesting white   matter microvascular ischemic change. The ventricles and sulci are normal   in size for patient's age.    There is no extraaxial fluid collection.    There is no displaced calvarial fracture. The visualized orbits are   within normal limits. The visualized portions of the paranasal sinuses   are well aerated. The mastoid air cells are well aerated.      IMPRESSION: No acute intracranial hemorrhage or mass effect.    < end of copied text >    Imaging Personally Reviewed:  YES    Consultant(s) Notes Reviewed:   YES    Care Discussed with Consultants :     Plan of care was discussed with patient and /or primary care giver; all questions and concerns were addressed and care was aligned with patient's wishes.

## 2023-01-28 NOTE — PROGRESS NOTE ADULT - ASSESSMENT
Patient is a 67F, with PMHx of Bipolar disorder, Anxiety, Insomnia, Alcohol abuse (relapsed since her son passed away in Feb 2021), and HLD, who comes in with withdrawal symptoms after not taking Xanax for 3 days. Admitted for benzodiazepine withdrawal. CH neg for intracranial pathology. UA mildly +, but asymptomatic. Will monitor off ABX.   Patient examined this AM, A&Ox3, calm & coherent. No tremors or agitation noted. Denies depression & hallucinations.   Tele Psych consulted, recommends to restart home dose Xanax.   SW consulted for safe discharge with out/pt alcohol rehab. 
67 year old woman with PMH of chronic alcohol abuse with w/ h/o 2 ETOH rehab courses and remote history of , panic / anxiety disorder,- chronically on xanax 1mg tid, continues to grieve due to loss of only child brought in by EMS for dizzy and panic spells, muscle spasms while waiting to  her prescription for Xanax, zoloft and Ambien. Of note, she drinks alcohol and uses her medications  as well daily but has not used them in the last few days.     A/P:  #BZ withdrawal   #ETOH Abuse - at risk of withdrawal   #Anxiety disorder   #HTN- new onset

## 2023-01-29 ENCOUNTER — TRANSCRIPTION ENCOUNTER (OUTPATIENT)
Age: 68
End: 2023-01-29

## 2023-01-29 VITALS
RESPIRATION RATE: 18 BRPM | HEART RATE: 59 BPM | OXYGEN SATURATION: 96 % | SYSTOLIC BLOOD PRESSURE: 151 MMHG | TEMPERATURE: 98 F | DIASTOLIC BLOOD PRESSURE: 71 MMHG

## 2023-01-29 LAB
ANION GAP SERPL CALC-SCNC: 6 MMOL/L — SIGNIFICANT CHANGE UP (ref 5–17)
BUN SERPL-MCNC: 13 MG/DL — SIGNIFICANT CHANGE UP (ref 7–18)
CALCIUM SERPL-MCNC: 9.5 MG/DL — SIGNIFICANT CHANGE UP (ref 8.4–10.5)
CHLORIDE SERPL-SCNC: 110 MMOL/L — HIGH (ref 96–108)
CO2 SERPL-SCNC: 31 MMOL/L — SIGNIFICANT CHANGE UP (ref 22–31)
CREAT SERPL-MCNC: 0.71 MG/DL — SIGNIFICANT CHANGE UP (ref 0.5–1.3)
EGFR: 93 ML/MIN/1.73M2 — SIGNIFICANT CHANGE UP
GLUCOSE SERPL-MCNC: 106 MG/DL — HIGH (ref 70–99)
HCT VFR BLD CALC: 35.1 % — SIGNIFICANT CHANGE UP (ref 34.5–45)
HGB BLD-MCNC: 11.2 G/DL — LOW (ref 11.5–15.5)
MCHC RBC-ENTMCNC: 31.7 PG — SIGNIFICANT CHANGE UP (ref 27–34)
MCHC RBC-ENTMCNC: 31.9 GM/DL — LOW (ref 32–36)
MCV RBC AUTO: 99.4 FL — SIGNIFICANT CHANGE UP (ref 80–100)
NRBC # BLD: 0 /100 WBCS — SIGNIFICANT CHANGE UP (ref 0–0)
PLATELET # BLD AUTO: 145 K/UL — LOW (ref 150–400)
POTASSIUM SERPL-MCNC: 4.5 MMOL/L — SIGNIFICANT CHANGE UP (ref 3.5–5.3)
POTASSIUM SERPL-SCNC: 4.5 MMOL/L — SIGNIFICANT CHANGE UP (ref 3.5–5.3)
RBC # BLD: 3.53 M/UL — LOW (ref 3.8–5.2)
RBC # FLD: 15.6 % — HIGH (ref 10.3–14.5)
SODIUM SERPL-SCNC: 147 MMOL/L — HIGH (ref 135–145)
WBC # BLD: 5.18 K/UL — SIGNIFICANT CHANGE UP (ref 3.8–10.5)
WBC # FLD AUTO: 5.18 K/UL — SIGNIFICANT CHANGE UP (ref 3.8–10.5)

## 2023-01-29 PROCEDURE — 84100 ASSAY OF PHOSPHORUS: CPT

## 2023-01-29 PROCEDURE — 83735 ASSAY OF MAGNESIUM: CPT

## 2023-01-29 PROCEDURE — 86703 HIV-1/HIV-2 1 RESULT ANTBDY: CPT

## 2023-01-29 PROCEDURE — 96375 TX/PRO/DX INJ NEW DRUG ADDON: CPT

## 2023-01-29 PROCEDURE — 80053 COMPREHEN METABOLIC PANEL: CPT

## 2023-01-29 PROCEDURE — 70450 CT HEAD/BRAIN W/O DYE: CPT | Mod: MA

## 2023-01-29 PROCEDURE — 85025 COMPLETE CBC W/AUTO DIFF WBC: CPT

## 2023-01-29 PROCEDURE — 80048 BASIC METABOLIC PNL TOTAL CA: CPT

## 2023-01-29 PROCEDURE — 86803 HEPATITIS C AB TEST: CPT

## 2023-01-29 PROCEDURE — 81001 URINALYSIS AUTO W/SCOPE: CPT

## 2023-01-29 PROCEDURE — 85027 COMPLETE CBC AUTOMATED: CPT

## 2023-01-29 PROCEDURE — 84702 CHORIONIC GONADOTROPIN TEST: CPT

## 2023-01-29 PROCEDURE — 99285 EMERGENCY DEPT VISIT HI MDM: CPT | Mod: 25

## 2023-01-29 PROCEDURE — 84484 ASSAY OF TROPONIN QUANT: CPT

## 2023-01-29 PROCEDURE — 36415 COLL VENOUS BLD VENIPUNCTURE: CPT

## 2023-01-29 PROCEDURE — 82550 ASSAY OF CK (CPK): CPT

## 2023-01-29 PROCEDURE — 99239 HOSP IP/OBS DSCHRG MGMT >30: CPT

## 2023-01-29 PROCEDURE — 96374 THER/PROPH/DIAG INJ IV PUSH: CPT

## 2023-01-29 PROCEDURE — 93005 ELECTROCARDIOGRAM TRACING: CPT

## 2023-01-29 PROCEDURE — 83605 ASSAY OF LACTIC ACID: CPT

## 2023-01-29 PROCEDURE — 87637 SARSCOV2&INF A&B&RSV AMP PRB: CPT

## 2023-01-29 RX ORDER — SERTRALINE 25 MG/1
1 TABLET, FILM COATED ORAL
Qty: 30 | Refills: 0
Start: 2023-01-29 | End: 2023-02-27

## 2023-01-29 RX ORDER — ALPRAZOLAM 0.25 MG
1 TABLET ORAL
Qty: 90 | Refills: 0
Start: 2023-01-29 | End: 2023-02-27

## 2023-01-29 RX ORDER — ALPRAZOLAM 0.25 MG
1 TABLET ORAL
Qty: 0 | Refills: 0 | DISCHARGE

## 2023-01-29 RX ORDER — KETOROLAC TROMETHAMINE 30 MG/ML
15 SYRINGE (ML) INJECTION ONCE
Refills: 0 | Status: DISCONTINUED | OUTPATIENT
Start: 2023-01-29 | End: 2023-01-29

## 2023-01-29 RX ORDER — SERTRALINE 25 MG/1
1 TABLET, FILM COATED ORAL
Qty: 0 | Refills: 0 | DISCHARGE

## 2023-01-29 RX ORDER — LOSARTAN POTASSIUM 100 MG/1
1 TABLET, FILM COATED ORAL
Qty: 30 | Refills: 0
Start: 2023-01-29 | End: 2023-02-27

## 2023-01-29 RX ADMIN — Medication 50 MILLIGRAM(S): at 13:42

## 2023-01-29 RX ADMIN — Medication 100 MILLIGRAM(S): at 13:39

## 2023-01-29 RX ADMIN — Medication 15 MILLIGRAM(S): at 00:18

## 2023-01-29 RX ADMIN — Medication 15 MILLIGRAM(S): at 00:48

## 2023-01-29 RX ADMIN — SERTRALINE 100 MILLIGRAM(S): 25 TABLET, FILM COATED ORAL at 13:40

## 2023-01-29 RX ADMIN — Medication 75 MILLIGRAM(S): at 05:53

## 2023-01-29 RX ADMIN — Medication 1 MILLIGRAM(S): at 13:39

## 2023-01-29 RX ADMIN — ENOXAPARIN SODIUM 40 MILLIGRAM(S): 100 INJECTION SUBCUTANEOUS at 06:00

## 2023-01-29 RX ADMIN — Medication 1 TABLET(S): at 13:39

## 2023-01-29 RX ADMIN — NYSTATIN CREAM 1 APPLICATION(S): 100000 CREAM TOPICAL at 05:54

## 2023-01-29 RX ADMIN — PREGABALIN 1000 MICROGRAM(S): 225 CAPSULE ORAL at 13:39

## 2023-01-29 NOTE — CHART NOTE - NSCHARTNOTEFT_GEN_A_CORE
EVENT:   1/29/23, 1am, patient c/o back pain level 8/10. Refused Gabapentin. Requested another pain medication. (Cr - 0.58).    HPI:      68 y/o female with PMH of Bipolar disorder, Anxiety, Insomnia, Alcohol abuse (relapsed since her son passed away in Feb 2021), and HLD, who comes in with withdrawal symptoms after not taking Xanax for 3 days. She ran out her medications 3 days ago and was on her way to pharmacy to pick them up. While she was driving, she started having visual hallucinations with red, green colors. When she arrived at the pharmacy, she was dizzy, still seeing colors, had palpitations, and her arms were swinging. She had to lean on the wall and the episode lasted for 10 minutes. She screamed for help and EMS was called. She endorses chronic R sided sciatica. Right now, endorses L upper back burning pain, headache, anxiety, and fatigue from insomnia, and denies any current fever, chills, nausea, vomit, chest pain, shortness of breath, cough, lightheadedness, abdominal pain, diarrhea, dark/bloody stool, dysuria, hematuria, loss of strength, or loss of sensation.   (26 Jan 2023 23:44)    OBJECTIVE:  Vital Signs Last 24 Hrs  T(C): 36.9 (28 Jan 2023 20:19), Max: 36.9 (28 Jan 2023 20:19)  T(F): 98.5 (28 Jan 2023 20:19), Max: 98.5 (28 Jan 2023 20:19)  HR: 65 (28 Jan 2023 20:19) (62 - 71)  BP: 153/67 (28 Jan 2023 23:51) (153/67 - 165/79)  BP(mean): --  RR: 18 (28 Jan 2023 20:19) (18 - 18)  SpO2: 95% (28 Jan 2023 20:19) (95% - 97%)    Parameters below as of 28 Jan 2023 20:19  Patient On (Oxygen Delivery Method): room air        FOCUSED PHYSICAL EXAM:    LABS:                        11.0   4.80  )-----------( 140      ( 28 Jan 2023 05:40 )             33.7   CARDIAC MARKERS ( 27 Jan 2023 05:35 )  x     / x     / 45 U/L / x     / x        01-28    147<H>  |  111<H>  |  8   ----------------------------<  98  3.4<L>   |  31  |  0.58    Ca    9.0      28 Jan 2023 05:40  Phos  2.3     01-28  Mg     2.3     01-28    TPro  5.9<L>  /  Alb  2.5<L>  /  TBili  0.3  /  DBili  x   /  AST  34  /  ALT  21  /  AlkPhos  86  01-27    PLAN:   - Ketorolac (Toradol) 15 mg IVP x 1 dose for severe pain.     FOLLOW UP / RESULT:   - Reassess patient pain level,  - Continue supportive care and treatment,   - Maintain patient safety and comfort level.
Pt is a 67 year old female who came to the ED  with complaint of  dizziness. Pt screened positive for sbirt and was able to participate in screening. Pt visited at bedside re consult, she appeared alert and oriented . Belen introduced self ,role and purpose of visit explained. Pt reports excessive drinking but declines active referral to treatment. She repots loosing connection to prior support services and MH program where she sees a psychiatrist / psychotherapist once  a month. She denied illicit drug use. Importance of reconnecting to the support services were fully explained. Emotional support ,psychoeducation re alcohol, Community mental health and aosas resources. Sbirt screen completed in sunrise. Pt was socially cleared and physician made aware.

## 2023-01-29 NOTE — DISCHARGE NOTE PROVIDER - CARE PROVIDER_API CALL
Renetta Beck)  Family Medicine  [address] [place] [address]  Phone: [phone]  Fax: [phone]  Follow Up Time: 1 week    MAURICIO SLATER  Psychiatry & Neurology  [address] [place] [address]  Phone: ()-  Fax: ()-  Follow Up Time: 1 week

## 2023-01-29 NOTE — DISCHARGE NOTE NURSING/CASE MANAGEMENT/SOCIAL WORK - PATIENT PORTAL LINK FT
You can access the FollowMyHealth Patient Portal offered by [facility] by registering at the following website: [email or web address]. By joining "[employer]"’s FollowMyHealth portal, you will also be able to view your health information using other applications (apps) compatible with our system.

## 2023-01-29 NOTE — DISCHARGE NOTE PROVIDER - NSDCMRMEDTOKEN_GEN_ALL_CORE_FT
ALPRAZolam 1 mg oral tablet: 1 tab(s) orally 3 times a day MDD:3 Tabs  atorvastatin 10 mg oral tablet: 1 tab(s) orally once a day  folic acid 1 mg oral tablet: 1 tab(s) orally once a day  gabapentin 100 mg oral capsule: 1 cap(s) orally 2 times a day, As Needed  sertraline 100 mg oral tablet: 1 tab(s) orally once a day (at bedtime)  zolpidem 10 mg oral tablet: 1 tab(s) orally once a day (at bedtime)   ALPRAZolam 1 mg oral tablet: 1 tab(s) orally 3 times a day MDD:3 Tabs  atorvastatin 10 mg oral tablet: 1 tab(s) orally once a day  folic acid 1 mg oral tablet: 1 tab(s) orally once a day  gabapentin 100 mg oral capsule: 1 cap(s) orally 2 times a day, As Needed  losartan 25 mg oral tablet: 1 tab(s) orally once a day   sertraline 100 mg oral tablet: 1 tab(s) orally once a day (at bedtime)  zolpidem 10 mg oral tablet: 1 tab(s) orally once a day (at bedtime)

## 2023-01-29 NOTE — DISCHARGE NOTE PROVIDER - ATTENDING DISCHARGE PHYSICAL EXAMINATION:
Vital Signs Last 24 Hrs  T(C): 36.5 (29 Jan 2023 12:45), Max: 36.9 (28 Jan 2023 20:19)  T(F): 97.7 (29 Jan 2023 12:45), Max: 98.5 (28 Jan 2023 20:19)  HR: 59 (29 Jan 2023 12:45) (58 - 65)  BP: 151/71 (29 Jan 2023 12:45) (147/69 - 179/74)  RR: 18 (29 Jan 2023 12:45) (18 - 18)  SpO2: 96% (29 Jan 2023 12:45) (95% - 96%)    Parameters below as of 29 Jan 2023 12:45  Patient On (Oxygen Delivery Method): room air    GENERAL: NAD  HEENT: Normocephalic;  conjunctivae and sclerae clear; moist mucous membranes;   NECK : supple  CHEST/LUNG: Clear to auscultation bilaterally with good air entry   HEART: S1 S2  regular; no murmurs, gallops or rubs  ABDOMEN: Soft, Nontender, Nondistended; Bowel sounds present  EXTREMITIES: no cyanosis; no edema; no calf tenderness  SKIN: warm and dry; no rash  NERVOUS SYSTEM:  Awake and alert; Oriented  to place, person and time ; no new deficits, tearful and emotional

## 2023-01-29 NOTE — DISCHARGE NOTE NURSING/CASE MANAGEMENT/SOCIAL WORK - NSDCPEFALRISK_GEN_ALL_CORE
For information on Fall & Injury Prevention, visit: [email or web address] OR  [email or web address] OR  https://www.cdc.gov/steadi/patient.html

## 2023-01-29 NOTE — DISCHARGE NOTE PROVIDER - HOSPITAL COURSE
Patient is a 67F, with PMHx of Bipolar disorder, Anxiety, Insomnia, Alcohol abuse (relapsed since her son passed away in Feb 2021), and HLD, who comes in with withdrawal symptoms after not taking Xanax for 3 days. Admitted for benzodiazepine withdrawal. CH neg for intracranial pathology. UA mildly +, but asymptomatic. Will monitor off ABX.   Patient examined this AM, A&Ox3, calm & coherent. No tremors or agitation noted. Denies depression & hallucinations.   Tele Psych consulted, recommends to restart home dose Xanax. Patient without hallucinations will follow up with Psychologist outpatient.    Medically optimized for discharge  Discharge discussed with attending.  Note this is just a brief course for full course please refer to daily progress and consult notes.

## 2023-01-29 NOTE — DISCHARGE NOTE PROVIDER - PROVIDER TOKENS
PROVIDER:[TOKEN:[[id number]],FOLLOWUP:[1 week]],PROVIDER:[TOKEN:[[id number]],FOLLOWUP:[1 week]]

## 2023-01-29 NOTE — DISCHARGE NOTE PROVIDER - NSDCCPCAREPLAN_GEN_ALL_CORE_FT
PRINCIPAL DISCHARGE DIAGNOSIS  Diagnosis: Benzodiazepine withdrawal  Assessment and Plan of Treatment: You presented to the hospital with withdrawal symptoms and hallucinations. This was likely due to benxodiazepine withdrawal in the setting of not taking your Xanax for 3 days. The Psychoplogist was consulted and evaluated you while you were in the hospital and recommended that your Xanax be restarted. You should continue taking your ZOloft and you should follow up with your outpatient Psychologist.   What do I need to know about benzodiazepine safety?  Do not mix benzodiazepines with medicines, drugs, or alcohol. The combination can cause an overdose, or cause you to stop breathing.  Learn about the signs of an overdose so you know how to respond. Tell others about these signs so they will know what to do if needed.  Keep benzodiazepines out of the reach of children. Store them in a locked cabinet or in a location that children cannot get to.      SECONDARY DISCHARGE DIAGNOSES  Diagnosis: Anxiety disorder  Assessment and Plan of Treatment: See plan as above    Diagnosis: Bipolar 1 disorder, depressed  Assessment and Plan of Treatment: Please continue to take your Zoloft. Please follow up with your outpatient Psychologist.    Diagnosis: Alcohol dependence with withdrawal  Assessment and Plan of Treatment: You were admitted for withdrawal symptoms likely in the setting of Benzodiazepine withdrawal and Alcohol withdrawal. Your symptoms have resolved and it is IMPERATIVe that you    Follow up with PCP and Psychiatry outpateint   In order to optimize your overall health and prevent adverse events, please abstain from ingesting alcohol. Continue to supplement with recommended vitamins and follow-up with your primary care provider for further medical care.   It is highly recommended to attend AA meetings to help create a sober lifestyle. If you need immediate assistance with substance abuse you may contact the [facility] by calling [phone].  [facility]: [phone]. [facility] -[phone]      Diagnosis: Mild hypertension  Assessment and Plan of Treatment: You had a couple of high blood pressure readings greater than 150 systolic blood pressure. This was likely due to your withdrawal symptoms and your blood pressure has resolved and returned to your blood pressure. You should follow up with your PCP for continued monitoring of your blood pressure and PCP can determine intitiation of low dose blood pressure medications.     PRINCIPAL DISCHARGE DIAGNOSIS  Diagnosis: Benzodiazepine withdrawal  Assessment and Plan of Treatment: You presented to the hospital with withdrawal symptoms and hallucinations. This was likely due to benxodiazepine withdrawal in the setting of not taking your Xanax for 3 days. The Psychoplogist was consulted and evaluated you while you were in the hospital and recommended that your Xanax be restarted. You should continue taking your ZOloft and you should follow up with your outpatient Psychologist.   What do I need to know about benzodiazepine safety?  Do not mix benzodiazepines with medicines, drugs, or alcohol. The combination can cause an overdose, or cause you to stop breathing.  Learn about the signs of an overdose so you know how to respond. Tell others about these signs so they will know what to do if needed.  Keep benzodiazepines out of the reach of children. Store them in a locked cabinet or in a location that children cannot get to.      SECONDARY DISCHARGE DIAGNOSES  Diagnosis: Alcohol dependence with withdrawal  Assessment and Plan of Treatment: You were admitted for withdrawal symptoms likely in the setting of Benzodiazepine withdrawal and Alcohol withdrawal. Your symptoms have resolved and it is IMPERATIVe that you    Follow up with PCP and Psychiatry outpateint   In order to optimize your overall health and prevent adverse events, please abstain from ingesting alcohol. Continue to supplement with recommended vitamins and follow-up with your primary care provider for further medical care.   It is highly recommended to attend AA meetings to help create a sober lifestyle. If you need immediate assistance with substance abuse you may contact the [facility] by calling [phone].  [facility]: [phone]. [facility] -[phone]      Diagnosis: Mild hypertension  Assessment and Plan of Treatment: You had a couple of high blood pressure readings greater than 150 systolic blood pressure. This could  likely be due to your withdrawal symptoms. However, given improvemnet in other symptoms but still elevated BP, would recommend to start losartan 25 mg OD and follow up with PCP.    Diagnosis: Anxiety disorder  Assessment and Plan of Treatment: See plan as above    Diagnosis: Bipolar 1 disorder, depressed  Assessment and Plan of Treatment: Please continue to take your Zoloft. Please follow up with your outpatient Psychologist.    Diagnosis: Hypernatremia  Assessment and Plan of Treatment: Your sodium levels were mildly elevated, would recommend oral hydration.

## 2023-10-27 NOTE — ED ADULT NURSE NOTE - EXTENSIONS OF SELF_ADULT
You were seen and evaluated here in the emergency department for abdominal pain.  You do have diverticulitis as you expected as you have had similar symptoms in the past with episodes of diverticulitis.  They did recommend having a colonoscopy performed once your acute symptoms are resolved if you have not had this recently due to your multiple episodes of diverticulitis.  I will discharge you home on Augmentin to take twice daily for the next 10 days.  Recommend taking to Zosyn as needed for any nausea.  Recommend plenty of fluids and rest.  We will also give you a few tablets of oxycodone for severe pain.    If you develop severe worsening symptoms, uncontrolled vomiting, fevers or other concerns return to the emergency department.  
None

## 2024-05-20 NOTE — PROGRESS NOTE ADULT - PROBLEM/PLAN-2
Lorazepam  Last Written Prescription Date: 5/2/24  Last Fill Quantity: 20 # of Refills: 0  Last Office Visit: 4/16/24    Norco  Last Written Prescription Date: 5/2/24  Last Fill Quantity: 20 # of Refills: 0  Last Office Visit: 4/16/24   DISPLAY PLAN FREE TEXT

## 2024-07-01 NOTE — ED PROVIDER NOTE - PRINCIPAL DIAGNOSIS
How Severe Is Your Cyst?: mild
Is This A New Presentation, Or A Follow-Up?: Cyst
Concussion without loss of consciousness, initial encounter

## 2024-09-02 NOTE — ED PROVIDER NOTE - NS ED MD DISPO SPECIAL CONSIDERATION1
Evaluated in the emergency department today for blister on the bottom of your right foot    Overall workup today is reassuring without any emergent findings  Negative for COVID-19 although based on your upper respiratory symptoms it is possible you may have COVID at this time.  You are just not testing positive    Chest x-ray is clear  Urine without evidence of a clear UTI but I have sent it for culture    Wet-to-dry dressing was placed on your foot    I have sent a prescription for clindamycin to your pharmacy for you to start today    Contact your wound care doctor tomorrow and your primary care doctor to schedule a prompt follow-up within the next week    Return to the emergency department if you have been on antibiotics for over 3 days and the redness is continuing to spread, you are just generally going in the wrong direction        
None
